# Patient Record
Sex: FEMALE | Race: WHITE | NOT HISPANIC OR LATINO | Employment: PART TIME | URBAN - METROPOLITAN AREA
[De-identification: names, ages, dates, MRNs, and addresses within clinical notes are randomized per-mention and may not be internally consistent; named-entity substitution may affect disease eponyms.]

---

## 2020-08-14 ENCOUNTER — HOSPITAL ENCOUNTER (OUTPATIENT)
Dept: LAB | Facility: MEDICAL CENTER | Age: 20
End: 2020-08-14
Attending: OBSTETRICS & GYNECOLOGY
Payer: COMMERCIAL

## 2020-08-14 LAB
25(OH)D3 SERPL-MCNC: 53 NG/ML (ref 30–100)
ALBUMIN SERPL BCP-MCNC: 4.8 G/DL (ref 3.2–4.9)
ALBUMIN/GLOB SERPL: 1.8 G/DL
ALP SERPL-CCNC: 79 U/L (ref 30–99)
ALT SERPL-CCNC: 9 U/L (ref 2–50)
ANION GAP SERPL CALC-SCNC: 13 MMOL/L (ref 7–16)
AST SERPL-CCNC: 17 U/L (ref 12–45)
BASOPHILS # BLD AUTO: 1 % (ref 0–1.8)
BASOPHILS # BLD: 0.06 K/UL (ref 0–0.12)
BILIRUB SERPL-MCNC: 0.7 MG/DL (ref 0.1–1.5)
BUN SERPL-MCNC: 14 MG/DL (ref 8–22)
CALCIUM SERPL-MCNC: 9.4 MG/DL (ref 8.4–10.2)
CHLORIDE SERPL-SCNC: 101 MMOL/L (ref 96–112)
CO2 SERPL-SCNC: 23 MMOL/L (ref 20–33)
CREAT SERPL-MCNC: 0.73 MG/DL (ref 0.5–1.4)
CRP SERPL HS-MCNC: 0.6 MG/L (ref 0–7.5)
DHEA-S SERPL-MCNC: 282 UG/DL (ref 148–407)
EOSINOPHIL # BLD AUTO: 0.08 K/UL (ref 0–0.51)
EOSINOPHIL NFR BLD: 1.3 % (ref 0–6.9)
ERYTHROCYTE [DISTWIDTH] IN BLOOD BY AUTOMATED COUNT: 38.7 FL (ref 35.9–50)
EST. AVERAGE GLUCOSE BLD GHB EST-MCNC: 103 MG/DL
FASTING STATUS PATIENT QL REPORTED: NORMAL
FERRITIN SERPL-MCNC: 17.1 NG/ML (ref 10–291)
GLOBULIN SER CALC-MCNC: 2.7 G/DL (ref 1.9–3.5)
GLUCOSE SERPL-MCNC: 83 MG/DL (ref 65–99)
HBA1C MFR BLD: 5.2 % (ref 0–5.6)
HCT VFR BLD AUTO: 40.6 % (ref 37–47)
HGB BLD-MCNC: 13.8 G/DL (ref 12–16)
IMM GRANULOCYTES # BLD AUTO: 0.01 K/UL (ref 0–0.11)
IMM GRANULOCYTES NFR BLD AUTO: 0.2 % (ref 0–0.9)
LYMPHOCYTES # BLD AUTO: 1.76 K/UL (ref 1–4.8)
LYMPHOCYTES NFR BLD: 27.9 % (ref 22–41)
MCH RBC QN AUTO: 30 PG (ref 27–33)
MCHC RBC AUTO-ENTMCNC: 34 G/DL (ref 33.6–35)
MCV RBC AUTO: 88.3 FL (ref 81.4–97.8)
MONOCYTES # BLD AUTO: 0.54 K/UL (ref 0–0.85)
MONOCYTES NFR BLD AUTO: 8.6 % (ref 0–13.4)
NEUTROPHILS # BLD AUTO: 3.86 K/UL (ref 2–7.15)
NEUTROPHILS NFR BLD: 61 % (ref 44–72)
NRBC # BLD AUTO: 0 K/UL
NRBC BLD-RTO: 0 /100 WBC
PLATELET # BLD AUTO: 313 K/UL (ref 164–446)
PMV BLD AUTO: 10.2 FL (ref 9–12.9)
POTASSIUM SERPL-SCNC: 3.7 MMOL/L (ref 3.6–5.5)
PROT SERPL-MCNC: 7.5 G/DL (ref 6–8.2)
RBC # BLD AUTO: 4.6 M/UL (ref 4.2–5.4)
SODIUM SERPL-SCNC: 137 MMOL/L (ref 135–145)
T3FREE SERPL-MCNC: 2.86 PG/ML (ref 2–4.4)
T4 FREE SERPL-MCNC: 1.26 NG/DL (ref 0.93–1.7)
T4 SERPL-MCNC: 6.5 UG/DL (ref 4–12)
TSH SERPL DL<=0.005 MIU/L-ACNC: 1.97 UIU/ML (ref 0.38–5.33)
VIT B12 SERPL-MCNC: 330 PG/ML (ref 211–911)
WBC # BLD AUTO: 6.3 K/UL (ref 4.8–10.8)

## 2020-08-14 PROCEDURE — 36415 COLL VENOUS BLD VENIPUNCTURE: CPT

## 2020-08-14 PROCEDURE — 86141 C-REACTIVE PROTEIN HS: CPT

## 2020-08-14 PROCEDURE — 83036 HEMOGLOBIN GLYCOSYLATED A1C: CPT

## 2020-08-14 PROCEDURE — 85025 COMPLETE CBC W/AUTO DIFF WBC: CPT

## 2020-08-14 PROCEDURE — 82728 ASSAY OF FERRITIN: CPT

## 2020-08-14 PROCEDURE — 82627 DEHYDROEPIANDROSTERONE: CPT

## 2020-08-14 PROCEDURE — 82306 VITAMIN D 25 HYDROXY: CPT

## 2020-08-14 PROCEDURE — 80053 COMPREHEN METABOLIC PANEL: CPT

## 2020-08-14 PROCEDURE — 84481 FREE ASSAY (FT-3): CPT

## 2020-08-14 PROCEDURE — 84436 ASSAY OF TOTAL THYROXINE: CPT

## 2020-08-14 PROCEDURE — 84439 ASSAY OF FREE THYROXINE: CPT

## 2020-08-14 PROCEDURE — 84443 ASSAY THYROID STIM HORMONE: CPT

## 2020-08-14 PROCEDURE — 82607 VITAMIN B-12: CPT

## 2022-05-21 ENCOUNTER — APPOINTMENT (OUTPATIENT)
Dept: RADIOLOGY | Facility: MEDICAL CENTER | Age: 22
End: 2022-05-21
Attending: EMERGENCY MEDICINE
Payer: COMMERCIAL

## 2022-05-21 ENCOUNTER — HOSPITAL ENCOUNTER (OUTPATIENT)
Facility: MEDICAL CENTER | Age: 22
End: 2022-05-22
Attending: EMERGENCY MEDICINE | Admitting: SURGERY
Payer: COMMERCIAL

## 2022-05-21 ENCOUNTER — ANESTHESIA EVENT (OUTPATIENT)
Dept: SURGERY | Facility: MEDICAL CENTER | Age: 22
End: 2022-05-21
Payer: COMMERCIAL

## 2022-05-21 ENCOUNTER — ANESTHESIA (OUTPATIENT)
Dept: SURGERY | Facility: MEDICAL CENTER | Age: 22
End: 2022-05-21
Payer: COMMERCIAL

## 2022-05-21 DIAGNOSIS — K35.80 ACUTE APPENDICITIS, UNSPECIFIED ACUTE APPENDICITIS TYPE: ICD-10-CM

## 2022-05-21 LAB
ALBUMIN SERPL BCP-MCNC: 4.7 G/DL (ref 3.2–4.9)
ALBUMIN/GLOB SERPL: 2 G/DL
ALP SERPL-CCNC: 74 U/L (ref 30–99)
ALT SERPL-CCNC: 13 U/L (ref 2–50)
ANION GAP SERPL CALC-SCNC: 12 MMOL/L (ref 7–16)
APPEARANCE UR: ABNORMAL
AST SERPL-CCNC: 21 U/L (ref 12–45)
BACTERIA #/AREA URNS HPF: NEGATIVE /HPF
BASOPHILS # BLD AUTO: 0.4 % (ref 0–1.8)
BASOPHILS # BLD: 0.09 K/UL (ref 0–0.12)
BILIRUB SERPL-MCNC: 0.2 MG/DL (ref 0.1–1.5)
BILIRUB UR QL STRIP.AUTO: NEGATIVE
BUN SERPL-MCNC: 13 MG/DL (ref 8–22)
CALCIUM SERPL-MCNC: 8.9 MG/DL (ref 8.5–10.5)
CHLORIDE SERPL-SCNC: 103 MMOL/L (ref 96–112)
CO2 SERPL-SCNC: 24 MMOL/L (ref 20–33)
COLOR UR: YELLOW
CREAT SERPL-MCNC: 0.7 MG/DL (ref 0.5–1.4)
EOSINOPHIL # BLD AUTO: 0.13 K/UL (ref 0–0.51)
EOSINOPHIL NFR BLD: 0.6 % (ref 0–6.9)
EPI CELLS #/AREA URNS HPF: NORMAL /HPF
ERYTHROCYTE [DISTWIDTH] IN BLOOD BY AUTOMATED COUNT: 40.3 FL (ref 35.9–50)
GFR SERPLBLD CREATININE-BSD FMLA CKD-EPI: 124 ML/MIN/1.73 M 2
GLOBULIN SER CALC-MCNC: 2.3 G/DL (ref 1.9–3.5)
GLUCOSE SERPL-MCNC: 107 MG/DL (ref 65–99)
GLUCOSE UR STRIP.AUTO-MCNC: NEGATIVE MG/DL
HCG SERPL QL: NEGATIVE
HCT VFR BLD AUTO: 39.5 % (ref 37–47)
HGB BLD-MCNC: 13.5 G/DL (ref 12–16)
IMM GRANULOCYTES # BLD AUTO: 0.09 K/UL (ref 0–0.11)
IMM GRANULOCYTES NFR BLD AUTO: 0.4 % (ref 0–0.9)
KETONES UR STRIP.AUTO-MCNC: ABNORMAL MG/DL
LEUKOCYTE ESTERASE UR QL STRIP.AUTO: NEGATIVE
LIPASE SERPL-CCNC: 23 U/L (ref 11–82)
LYMPHOCYTES # BLD AUTO: 1.57 K/UL (ref 1–4.8)
LYMPHOCYTES NFR BLD: 6.8 % (ref 22–41)
MCH RBC QN AUTO: 30.8 PG (ref 27–33)
MCHC RBC AUTO-ENTMCNC: 34.2 G/DL (ref 33.6–35)
MCV RBC AUTO: 90.2 FL (ref 81.4–97.8)
MICRO URNS: ABNORMAL
MONOCYTES # BLD AUTO: 1.46 K/UL (ref 0–0.85)
MONOCYTES NFR BLD AUTO: 6.3 % (ref 0–13.4)
NEUTROPHILS # BLD AUTO: 19.77 K/UL (ref 2–7.15)
NEUTROPHILS NFR BLD: 85.5 % (ref 44–72)
NITRITE UR QL STRIP.AUTO: NEGATIVE
NRBC # BLD AUTO: 0 K/UL
NRBC BLD-RTO: 0 /100 WBC
PH UR STRIP.AUTO: 5.5 [PH] (ref 5–8)
PLATELET # BLD AUTO: 329 K/UL (ref 164–446)
PMV BLD AUTO: 10 FL (ref 9–12.9)
POTASSIUM SERPL-SCNC: 3.4 MMOL/L (ref 3.6–5.5)
PROT SERPL-MCNC: 7 G/DL (ref 6–8.2)
PROT UR QL STRIP: NEGATIVE MG/DL
RBC # BLD AUTO: 4.38 M/UL (ref 4.2–5.4)
RBC # URNS HPF: NORMAL /HPF
RBC UR QL AUTO: NEGATIVE
SODIUM SERPL-SCNC: 139 MMOL/L (ref 135–145)
SP GR UR STRIP.AUTO: 1.03
UROBILINOGEN UR STRIP.AUTO-MCNC: 0.2 MG/DL
WBC # BLD AUTO: 23.1 K/UL (ref 4.8–10.8)
WBC #/AREA URNS HPF: NORMAL /HPF

## 2022-05-21 PROCEDURE — G0378 HOSPITAL OBSERVATION PER HR: HCPCS

## 2022-05-21 PROCEDURE — 99218 PR INITIAL OBSERVATION CARE,LEVL I: CPT | Mod: 57 | Performed by: SURGERY

## 2022-05-21 PROCEDURE — 160002 HCHG RECOVERY MINUTES (STAT): Performed by: SURGERY

## 2022-05-21 PROCEDURE — 501570 HCHG TROCAR, SEPARATOR: Performed by: SURGERY

## 2022-05-21 PROCEDURE — A9270 NON-COVERED ITEM OR SERVICE: HCPCS | Performed by: ANESTHESIOLOGY

## 2022-05-21 PROCEDURE — 44970 LAPAROSCOPY APPENDECTOMY: CPT | Mod: AS | Performed by: NURSE PRACTITIONER

## 2022-05-21 PROCEDURE — 00840 ANES IPER PX LOWER ABD NOS: CPT | Performed by: ANESTHESIOLOGY

## 2022-05-21 PROCEDURE — 84703 CHORIONIC GONADOTROPIN ASSAY: CPT

## 2022-05-21 PROCEDURE — 501571 HCHG TROCAR, SEPARATOR 12X100: Performed by: SURGERY

## 2022-05-21 PROCEDURE — 700102 HCHG RX REV CODE 250 W/ 637 OVERRIDE(OP): Performed by: SURGERY

## 2022-05-21 PROCEDURE — 81001 URINALYSIS AUTO W/SCOPE: CPT

## 2022-05-21 PROCEDURE — 88304 TISSUE EXAM BY PATHOLOGIST: CPT

## 2022-05-21 PROCEDURE — 700101 HCHG RX REV CODE 250: Performed by: SURGERY

## 2022-05-21 PROCEDURE — 94760 N-INVAS EAR/PLS OXIMETRY 1: CPT

## 2022-05-21 PROCEDURE — 501838 HCHG SUTURE GENERAL: Performed by: SURGERY

## 2022-05-21 PROCEDURE — 700111 HCHG RX REV CODE 636 W/ 250 OVERRIDE (IP): Performed by: ANESTHESIOLOGY

## 2022-05-21 PROCEDURE — 501450 HCHG STAPLES, ENDO MULTIFIRE: Performed by: SURGERY

## 2022-05-21 PROCEDURE — 85025 COMPLETE CBC W/AUTO DIFF WBC: CPT

## 2022-05-21 PROCEDURE — 74176 CT ABD & PELVIS W/O CONTRAST: CPT

## 2022-05-21 PROCEDURE — 500002 HCHG ADHESIVE, DERMABOND: Performed by: SURGERY

## 2022-05-21 PROCEDURE — 700101 HCHG RX REV CODE 250: Performed by: EMERGENCY MEDICINE

## 2022-05-21 PROCEDURE — 160048 HCHG OR STATISTICAL LEVEL 1-5: Performed by: SURGERY

## 2022-05-21 PROCEDURE — 96375 TX/PRO/DX INJ NEW DRUG ADDON: CPT

## 2022-05-21 PROCEDURE — 700111 HCHG RX REV CODE 636 W/ 250 OVERRIDE (IP): Performed by: EMERGENCY MEDICINE

## 2022-05-21 PROCEDURE — 500854 HCHG NEEDLE, INSUFFLATION FOR STEP: Performed by: SURGERY

## 2022-05-21 PROCEDURE — 700111 HCHG RX REV CODE 636 W/ 250 OVERRIDE (IP): Performed by: SURGERY

## 2022-05-21 PROCEDURE — 44970 LAPAROSCOPY APPENDECTOMY: CPT | Performed by: SURGERY

## 2022-05-21 PROCEDURE — 96376 TX/PRO/DX INJ SAME DRUG ADON: CPT

## 2022-05-21 PROCEDURE — 700105 HCHG RX REV CODE 258: Performed by: EMERGENCY MEDICINE

## 2022-05-21 PROCEDURE — 80053 COMPREHEN METABOLIC PANEL: CPT

## 2022-05-21 PROCEDURE — 160036 HCHG PACU - EA ADDL 30 MINS PHASE I: Performed by: SURGERY

## 2022-05-21 PROCEDURE — 36415 COLL VENOUS BLD VENIPUNCTURE: CPT

## 2022-05-21 PROCEDURE — 83690 ASSAY OF LIPASE: CPT

## 2022-05-21 PROCEDURE — 501581 HCHG TROCAR: Performed by: SURGERY

## 2022-05-21 PROCEDURE — 160009 HCHG ANES TIME/MIN: Performed by: SURGERY

## 2022-05-21 PROCEDURE — 700101 HCHG RX REV CODE 250: Performed by: ANESTHESIOLOGY

## 2022-05-21 PROCEDURE — 160035 HCHG PACU - 1ST 60 MINS PHASE I: Performed by: SURGERY

## 2022-05-21 PROCEDURE — 160029 HCHG SURGERY MINUTES - 1ST 30 MINS LEVEL 4: Performed by: SURGERY

## 2022-05-21 PROCEDURE — 99291 CRITICAL CARE FIRST HOUR: CPT

## 2022-05-21 PROCEDURE — A9270 NON-COVERED ITEM OR SERVICE: HCPCS | Performed by: SURGERY

## 2022-05-21 PROCEDURE — 700102 HCHG RX REV CODE 250 W/ 637 OVERRIDE(OP): Performed by: ANESTHESIOLOGY

## 2022-05-21 PROCEDURE — 160041 HCHG SURGERY MINUTES - EA ADDL 1 MIN LEVEL 4: Performed by: SURGERY

## 2022-05-21 PROCEDURE — 700105 HCHG RX REV CODE 258: Performed by: ANESTHESIOLOGY

## 2022-05-21 PROCEDURE — 96365 THER/PROPH/DIAG IV INF INIT: CPT

## 2022-05-21 PROCEDURE — 99140 ANES COMP EMERGENCY COND: CPT | Performed by: ANESTHESIOLOGY

## 2022-05-21 RX ORDER — ROCURONIUM BROMIDE 10 MG/ML
INJECTION, SOLUTION INTRAVENOUS PRN
Status: DISCONTINUED | OUTPATIENT
Start: 2022-05-21 | End: 2022-05-21 | Stop reason: SURG

## 2022-05-21 RX ORDER — CETIRIZINE HYDROCHLORIDE 10 MG/1
10 TABLET ORAL DAILY
COMMUNITY

## 2022-05-21 RX ORDER — HYDROMORPHONE HYDROCHLORIDE 1 MG/ML
0.4 INJECTION, SOLUTION INTRAMUSCULAR; INTRAVENOUS; SUBCUTANEOUS
Status: DISCONTINUED | OUTPATIENT
Start: 2022-05-21 | End: 2022-05-21 | Stop reason: HOSPADM

## 2022-05-21 RX ORDER — OXYCODONE HCL 5 MG/5 ML
10 SOLUTION, ORAL ORAL
Status: DISCONTINUED | OUTPATIENT
Start: 2022-05-21 | End: 2022-05-21 | Stop reason: HOSPADM

## 2022-05-21 RX ORDER — SODIUM CHLORIDE 9 MG/ML
INJECTION, SOLUTION INTRAVENOUS CONTINUOUS
Status: DISCONTINUED | OUTPATIENT
Start: 2022-05-21 | End: 2022-05-22

## 2022-05-21 RX ORDER — OXYCODONE HYDROCHLORIDE 10 MG/1
10 TABLET ORAL EVERY 4 HOURS PRN
Status: DISCONTINUED | OUTPATIENT
Start: 2022-05-21 | End: 2022-05-22 | Stop reason: HOSPADM

## 2022-05-21 RX ORDER — ONDANSETRON 2 MG/ML
INJECTION INTRAMUSCULAR; INTRAVENOUS PRN
Status: DISCONTINUED | OUTPATIENT
Start: 2022-05-21 | End: 2022-05-21 | Stop reason: SURG

## 2022-05-21 RX ORDER — BUPIVACAINE HYDROCHLORIDE 2.5 MG/ML
INJECTION, SOLUTION EPIDURAL; INFILTRATION; INTRACAUDAL
Status: DISCONTINUED | OUTPATIENT
Start: 2022-05-21 | End: 2022-05-21 | Stop reason: HOSPADM

## 2022-05-21 RX ORDER — KETOROLAC TROMETHAMINE 30 MG/ML
INJECTION, SOLUTION INTRAMUSCULAR; INTRAVENOUS PRN
Status: DISCONTINUED | OUTPATIENT
Start: 2022-05-21 | End: 2022-05-21 | Stop reason: SURG

## 2022-05-21 RX ORDER — OXYCODONE HCL 5 MG/5 ML
5 SOLUTION, ORAL ORAL
Status: COMPLETED | OUTPATIENT
Start: 2022-05-21 | End: 2022-05-21

## 2022-05-21 RX ORDER — CEFTRIAXONE 2 G/1
2 INJECTION, POWDER, FOR SOLUTION INTRAMUSCULAR; INTRAVENOUS ONCE
Status: COMPLETED | OUTPATIENT
Start: 2022-05-21 | End: 2022-05-21

## 2022-05-21 RX ORDER — DEXAMETHASONE SODIUM PHOSPHATE 4 MG/ML
INJECTION, SOLUTION INTRA-ARTICULAR; INTRALESIONAL; INTRAMUSCULAR; INTRAVENOUS; SOFT TISSUE PRN
Status: DISCONTINUED | OUTPATIENT
Start: 2022-05-21 | End: 2022-05-21 | Stop reason: SURG

## 2022-05-21 RX ORDER — SODIUM CHLORIDE 9 MG/ML
1000 INJECTION, SOLUTION INTRAVENOUS ONCE
Status: COMPLETED | OUTPATIENT
Start: 2022-05-21 | End: 2022-05-21

## 2022-05-21 RX ORDER — MEPERIDINE HYDROCHLORIDE 25 MG/ML
12.5 INJECTION INTRAMUSCULAR; INTRAVENOUS; SUBCUTANEOUS
Status: DISCONTINUED | OUTPATIENT
Start: 2022-05-21 | End: 2022-05-21 | Stop reason: HOSPADM

## 2022-05-21 RX ORDER — ACETAMINOPHEN 500 MG
500 TABLET ORAL EVERY 6 HOURS PRN
Status: DISCONTINUED | OUTPATIENT
Start: 2022-05-21 | End: 2022-05-22 | Stop reason: HOSPADM

## 2022-05-21 RX ORDER — OXYCODONE HYDROCHLORIDE 5 MG/1
5 TABLET ORAL EVERY 4 HOURS PRN
Status: DISCONTINUED | OUTPATIENT
Start: 2022-05-21 | End: 2022-05-22 | Stop reason: HOSPADM

## 2022-05-21 RX ORDER — SODIUM CHLORIDE, SODIUM LACTATE, POTASSIUM CHLORIDE, CALCIUM CHLORIDE 600; 310; 30; 20 MG/100ML; MG/100ML; MG/100ML; MG/100ML
INJECTION, SOLUTION INTRAVENOUS CONTINUOUS
Status: DISCONTINUED | OUTPATIENT
Start: 2022-05-21 | End: 2022-05-21 | Stop reason: HOSPADM

## 2022-05-21 RX ORDER — OXYCODONE HCL 5 MG/5 ML
5 SOLUTION, ORAL ORAL
Status: DISCONTINUED | OUTPATIENT
Start: 2022-05-21 | End: 2022-05-21 | Stop reason: HOSPADM

## 2022-05-21 RX ORDER — METRONIDAZOLE 500 MG/100ML
500 INJECTION, SOLUTION INTRAVENOUS ONCE
Status: COMPLETED | OUTPATIENT
Start: 2022-05-21 | End: 2022-05-21

## 2022-05-21 RX ORDER — ONDANSETRON 2 MG/ML
4 INJECTION INTRAMUSCULAR; INTRAVENOUS EVERY 4 HOURS PRN
Status: DISCONTINUED | OUTPATIENT
Start: 2022-05-21 | End: 2022-05-22 | Stop reason: HOSPADM

## 2022-05-21 RX ORDER — ALBUTEROL SULFATE 2.5 MG/3ML
2.5 SOLUTION RESPIRATORY (INHALATION)
Status: DISCONTINUED | OUTPATIENT
Start: 2022-05-21 | End: 2022-05-21 | Stop reason: HOSPADM

## 2022-05-21 RX ORDER — HYDROMORPHONE HYDROCHLORIDE 1 MG/ML
0.2 INJECTION, SOLUTION INTRAMUSCULAR; INTRAVENOUS; SUBCUTANEOUS
Status: DISCONTINUED | OUTPATIENT
Start: 2022-05-21 | End: 2022-05-21 | Stop reason: HOSPADM

## 2022-05-21 RX ORDER — ONDANSETRON 4 MG/1
4 TABLET, ORALLY DISINTEGRATING ORAL EVERY 4 HOURS PRN
Status: DISCONTINUED | OUTPATIENT
Start: 2022-05-21 | End: 2022-05-21

## 2022-05-21 RX ORDER — LIDOCAINE HYDROCHLORIDE 20 MG/ML
INJECTION, SOLUTION INFILTRATION; PERINEURAL
Status: DISCONTINUED | OUTPATIENT
Start: 2022-05-21 | End: 2022-05-21 | Stop reason: HOSPADM

## 2022-05-21 RX ORDER — HALOPERIDOL 5 MG/ML
1 INJECTION INTRAMUSCULAR
Status: DISCONTINUED | OUTPATIENT
Start: 2022-05-21 | End: 2022-05-21 | Stop reason: HOSPADM

## 2022-05-21 RX ORDER — ONDANSETRON 2 MG/ML
4 INJECTION INTRAMUSCULAR; INTRAVENOUS ONCE
Status: COMPLETED | OUTPATIENT
Start: 2022-05-21 | End: 2022-05-21

## 2022-05-21 RX ORDER — ONDANSETRON 2 MG/ML
4 INJECTION INTRAMUSCULAR; INTRAVENOUS
Status: DISCONTINUED | OUTPATIENT
Start: 2022-05-21 | End: 2022-05-21 | Stop reason: HOSPADM

## 2022-05-21 RX ORDER — MIDAZOLAM HYDROCHLORIDE 1 MG/ML
1 INJECTION INTRAMUSCULAR; INTRAVENOUS
Status: DISCONTINUED | OUTPATIENT
Start: 2022-05-21 | End: 2022-05-21 | Stop reason: HOSPADM

## 2022-05-21 RX ORDER — KETOROLAC TROMETHAMINE 30 MG/ML
30 INJECTION, SOLUTION INTRAMUSCULAR; INTRAVENOUS ONCE
Status: COMPLETED | OUTPATIENT
Start: 2022-05-21 | End: 2022-05-21

## 2022-05-21 RX ORDER — SODIUM CHLORIDE, SODIUM LACTATE, POTASSIUM CHLORIDE, CALCIUM CHLORIDE 600; 310; 30; 20 MG/100ML; MG/100ML; MG/100ML; MG/100ML
INJECTION, SOLUTION INTRAVENOUS
Status: DISCONTINUED | OUTPATIENT
Start: 2022-05-21 | End: 2022-05-21 | Stop reason: SURG

## 2022-05-21 RX ORDER — ACETAMINOPHEN 500 MG
500-1000 TABLET ORAL EVERY 6 HOURS PRN
COMMUNITY

## 2022-05-21 RX ORDER — HYDROMORPHONE HYDROCHLORIDE 1 MG/ML
0.1 INJECTION, SOLUTION INTRAMUSCULAR; INTRAVENOUS; SUBCUTANEOUS
Status: DISCONTINUED | OUTPATIENT
Start: 2022-05-21 | End: 2022-05-21 | Stop reason: HOSPADM

## 2022-05-21 RX ORDER — HYDROMORPHONE HYDROCHLORIDE 2 MG/ML
INJECTION, SOLUTION INTRAMUSCULAR; INTRAVENOUS; SUBCUTANEOUS PRN
Status: DISCONTINUED | OUTPATIENT
Start: 2022-05-21 | End: 2022-05-21 | Stop reason: SURG

## 2022-05-21 RX ORDER — DIPHENHYDRAMINE HYDROCHLORIDE 50 MG/ML
12.5 INJECTION INTRAMUSCULAR; INTRAVENOUS
Status: COMPLETED | OUTPATIENT
Start: 2022-05-21 | End: 2022-05-21

## 2022-05-21 RX ORDER — OXYCODONE HCL 5 MG/5 ML
10 SOLUTION, ORAL ORAL
Status: COMPLETED | OUTPATIENT
Start: 2022-05-21 | End: 2022-05-21

## 2022-05-21 RX ADMIN — CEFTRIAXONE SODIUM 2 G: 2 INJECTION, POWDER, FOR SOLUTION INTRAMUSCULAR; INTRAVENOUS at 07:58

## 2022-05-21 RX ADMIN — OXYCODONE 5 MG: 5 TABLET ORAL at 19:52

## 2022-05-21 RX ADMIN — METRONIDAZOLE 500 MG: 500 INJECTION, SOLUTION INTRAVENOUS at 08:09

## 2022-05-21 RX ADMIN — OXYCODONE HYDROCHLORIDE 5 MG: 5 SOLUTION ORAL at 15:30

## 2022-05-21 RX ADMIN — SODIUM CHLORIDE, POTASSIUM CHLORIDE, SODIUM LACTATE AND CALCIUM CHLORIDE: 600; 310; 30; 20 INJECTION, SOLUTION INTRAVENOUS at 10:42

## 2022-05-21 RX ADMIN — DIPHENHYDRAMINE HYDROCHLORIDE 12.5 MG: 50 INJECTION INTRAMUSCULAR; INTRAVENOUS at 12:41

## 2022-05-21 RX ADMIN — DEXAMETHASONE SODIUM PHOSPHATE 8 MG: 4 INJECTION, SOLUTION INTRA-ARTICULAR; INTRALESIONAL; INTRAMUSCULAR; INTRAVENOUS; SOFT TISSUE at 10:56

## 2022-05-21 RX ADMIN — PROPOFOL 170 MG: 10 INJECTION, EMULSION INTRAVENOUS at 10:31

## 2022-05-21 RX ADMIN — SODIUM CHLORIDE, POTASSIUM CHLORIDE, SODIUM LACTATE AND CALCIUM CHLORIDE: 600; 310; 30; 20 INJECTION, SOLUTION INTRAVENOUS at 14:28

## 2022-05-21 RX ADMIN — FENTANYL CITRATE 50 MCG: 50 INJECTION, SOLUTION INTRAMUSCULAR; INTRAVENOUS at 04:54

## 2022-05-21 RX ADMIN — DIPHENHYDRAMINE HYDROCHLORIDE 12.5 MG: 50 INJECTION INTRAMUSCULAR; INTRAVENOUS at 13:09

## 2022-05-21 RX ADMIN — ROCURONIUM BROMIDE 50 MG: 10 INJECTION, SOLUTION INTRAVENOUS at 10:31

## 2022-05-21 RX ADMIN — ONDANSETRON 4 MG: 2 INJECTION INTRAMUSCULAR; INTRAVENOUS at 11:11

## 2022-05-21 RX ADMIN — HYDROMORPHONE HYDROCHLORIDE 1 MG: 2 INJECTION INTRAMUSCULAR; INTRAVENOUS; SUBCUTANEOUS at 10:31

## 2022-05-21 RX ADMIN — ONDANSETRON 4 MG: 2 INJECTION INTRAMUSCULAR; INTRAVENOUS at 04:52

## 2022-05-21 RX ADMIN — KETOROLAC TROMETHAMINE 30 MG: 30 INJECTION, SOLUTION INTRAMUSCULAR; INTRAVENOUS at 04:53

## 2022-05-21 RX ADMIN — HYDROMORPHONE HYDROCHLORIDE 1 MG: 2 INJECTION INTRAMUSCULAR; INTRAVENOUS; SUBCUTANEOUS at 11:12

## 2022-05-21 RX ADMIN — KETOROLAC TROMETHAMINE 30 MG: 30 INJECTION, SOLUTION INTRAMUSCULAR at 11:11

## 2022-05-21 RX ADMIN — SODIUM CHLORIDE 1000 ML: 9 INJECTION, SOLUTION INTRAVENOUS at 04:56

## 2022-05-21 RX ADMIN — SODIUM CHLORIDE: 9 INJECTION, SOLUTION INTRAVENOUS at 07:59

## 2022-05-21 ASSESSMENT — LIFESTYLE VARIABLES
HAVE YOU EVER FELT YOU SHOULD CUT DOWN ON YOUR DRINKING: NO
EVER FELT BAD OR GUILTY ABOUT YOUR DRINKING: NO
HOW MANY TIMES IN THE PAST YEAR HAVE YOU HAD 5 OR MORE DRINKS IN A DAY: 0
ALCOHOL_USE: YES
TOTAL SCORE: 0
DOES PATIENT WANT TO STOP DRINKING: NO
TOTAL SCORE: 0
EVER HAD A DRINK FIRST THING IN THE MORNING TO STEADY YOUR NERVES TO GET RID OF A HANGOVER: NO
AVERAGE NUMBER OF DAYS PER WEEK YOU HAVE A DRINK CONTAINING ALCOHOL: 0
HAVE PEOPLE ANNOYED YOU BY CRITICIZING YOUR DRINKING: NO
ON A TYPICAL DAY WHEN YOU DRINK ALCOHOL HOW MANY DRINKS DO YOU HAVE: 1
TOTAL SCORE: 0
CONSUMPTION TOTAL: NEGATIVE

## 2022-05-21 ASSESSMENT — COGNITIVE AND FUNCTIONAL STATUS - GENERAL
SUGGESTED CMS G CODE MODIFIER MOBILITY: CH
DAILY ACTIVITIY SCORE: 24
MOBILITY SCORE: 24
SUGGESTED CMS G CODE MODIFIER DAILY ACTIVITY: CH

## 2022-05-21 ASSESSMENT — PAIN SCALES - GENERAL: PAIN_LEVEL: 2

## 2022-05-21 ASSESSMENT — PATIENT HEALTH QUESTIONNAIRE - PHQ9
1. LITTLE INTEREST OR PLEASURE IN DOING THINGS: NOT AT ALL
2. FEELING DOWN, DEPRESSED, IRRITABLE, OR HOPELESS: NOT AT ALL
SUM OF ALL RESPONSES TO PHQ9 QUESTIONS 1 AND 2: 0

## 2022-05-21 ASSESSMENT — PAIN DESCRIPTION - PAIN TYPE
TYPE: ACUTE PAIN

## 2022-05-21 ASSESSMENT — FIBROSIS 4 INDEX: FIB4 SCORE: 0.42

## 2022-05-21 NOTE — ED PROVIDER NOTES
"CHIEF COMPLAINT  Chief Complaint   Patient presents with   • Abdominal Pain   • N/V       HPI  Khushboo Khan is a 21 y.o. female who presents to the emergency department through triage with girlfriend for abdominal pain, nausea and vomiting.  Patient states she started having right lower quadrant abdominal pain around 10 PM.  History of celiac disease, states pain is unrelated to prior.  Associated with nausea and multiple episodes of vomiting.  No diarrhea.  Normal bowel movement yesterday.  No history for constipation.  Denies fever or chills.    Same-sex relationship.  Denies pregnancy.  No concern for STD.    REVIEW OF SYSTEMS  See HPI for further details. All other systems are negative.     PAST MEDICAL HISTORY   has a past medical history of Celiac disease.    SOCIAL HISTORY  Social History     Tobacco Use   • Smoking status: Never Smoker   • Smokeless tobacco: Never Used   Substance and Sexual Activity   • Alcohol use: Yes   • Drug use: Yes     Comment: THC   • Sexual activity: Not on file       SURGICAL HISTORY  patient denies any surgical history    CURRENT MEDICATIONS  Home Medications     Reviewed by Marifer Henderson RDEANA (Registered Nurse) on 05/21/22 at 1121  Med List Status: Complete   Medication Last Dose Status   acetaminophen (TYLENOL) 500 MG Tab 5/20/2022 Active   Cats Claw, Uncaria tomentosa, (CATS CLAW PO) 5/20/2022 Active   cetirizine (ZYRTEC) 10 MG Tab 5/20/2022 Active   Prenatal Vit-Fe Fumarate-FA (PRENATAL VITAMIN PO) 5/20/2022 Active                ALLERGIES  Allergies   Allergen Reactions   • Vicodin Hp [Apap-Fd&C Blue #1-Hydrocodone] Hives       PHYSICAL EXAM  VITAL SIGNS: /64   Pulse (!) 102   Temp 36.8 °C (98.3 °F)   Resp 17   Ht 1.727 m (5' 8\")   Wt 58.5 kg (128 lb 15.5 oz)   SpO2 98%   BMI 19.61 kg/m²   Pulse ox interpretation: I interpret this pulse ox as normal.  Constitutional: Alert in no apparent distress.  HENT: Normocephalic, atraumatic. Bilateral external " ears normal, Nose normal.   Eyes: Pupils are equal and reactive, Conjunctiva normal.   Neck: Normal range of motion, Supple  Lymphatic: No lymphadenopathy noted.   Cardiovascular: Regular rate and rhythm, no murmurs. Distal pulses intact.    Thorax & Lungs: Normal breath sounds.  No wheezing/rales/ronchi. No increased work of breathing  Abdomen: Soft, non-distended.  Tender to palpation in the right lower quadrant, positive McBurney point tenderness.  Focal guarding, no peritonitis.  No inguinal mass or hernia.  No CVA tenderness percussion.  Skin: Warm, Dry, No erythema, No rash.   Musculoskeletal: Good range of motion in all major joints.   Neurologic: Alert and orient x4.  Speech clear and cohesive.  Moves 4 extremity spontaneously.  Psychiatric: Affect normal, Judgment normal, Mood normal.       DIAGNOSTIC STUDIES / PROCEDURES  LABS  Results for orders placed or performed during the hospital encounter of 05/21/22   CBC WITH DIFFERENTIAL   Result Value Ref Range    WBC 23.1 (H) 4.8 - 10.8 K/uL    RBC 4.38 4.20 - 5.40 M/uL    Hemoglobin 13.5 12.0 - 16.0 g/dL    Hematocrit 39.5 37.0 - 47.0 %    MCV 90.2 81.4 - 97.8 fL    MCH 30.8 27.0 - 33.0 pg    MCHC 34.2 33.6 - 35.0 g/dL    RDW 40.3 35.9 - 50.0 fL    Platelet Count 329 164 - 446 K/uL    MPV 10.0 9.0 - 12.9 fL    Neutrophils-Polys 85.50 (H) 44.00 - 72.00 %    Lymphocytes 6.80 (L) 22.00 - 41.00 %    Monocytes 6.30 0.00 - 13.40 %    Eosinophils 0.60 0.00 - 6.90 %    Basophils 0.40 0.00 - 1.80 %    Immature Granulocytes 0.40 0.00 - 0.90 %    Nucleated RBC 0.00 /100 WBC    Neutrophils (Absolute) 19.77 (H) 2.00 - 7.15 K/uL    Lymphs (Absolute) 1.57 1.00 - 4.80 K/uL    Monos (Absolute) 1.46 (H) 0.00 - 0.85 K/uL    Eos (Absolute) 0.13 0.00 - 0.51 K/uL    Baso (Absolute) 0.09 0.00 - 0.12 K/uL    Immature Granulocytes (abs) 0.09 0.00 - 0.11 K/uL    NRBC (Absolute) 0.00 K/uL   COMP METABOLIC PANEL   Result Value Ref Range    Sodium 139 135 - 145 mmol/L    Potassium 3.4 (L)  3.6 - 5.5 mmol/L    Chloride 103 96 - 112 mmol/L    Co2 24 20 - 33 mmol/L    Anion Gap 12.0 7.0 - 16.0    Glucose 107 (H) 65 - 99 mg/dL    Bun 13 8 - 22 mg/dL    Creatinine 0.70 0.50 - 1.40 mg/dL    Calcium 8.9 8.5 - 10.5 mg/dL    AST(SGOT) 21 12 - 45 U/L    ALT(SGPT) 13 2 - 50 U/L    Alkaline Phosphatase 74 30 - 99 U/L    Total Bilirubin 0.2 0.1 - 1.5 mg/dL    Albumin 4.7 3.2 - 4.9 g/dL    Total Protein 7.0 6.0 - 8.2 g/dL    Globulin 2.3 1.9 - 3.5 g/dL    A-G Ratio 2.0 g/dL   LIPASE   Result Value Ref Range    Lipase 23 11 - 82 U/L   HCG QUAL SERUM   Result Value Ref Range    Beta-Hcg Qualitative Serum Negative Negative   URINALYSIS,CULTURE IF INDICATED    Specimen: Urine   Result Value Ref Range    Color Yellow     Character Cloudy (A)     Specific Gravity 1.027 <1.035    Ph 5.5 5.0 - 8.0    Glucose Negative Negative mg/dL    Ketones Trace (A) Negative mg/dL    Protein Negative Negative mg/dL    Bilirubin Negative Negative    Urobilinogen, Urine 0.2 Negative    Nitrite Negative Negative    Leukocyte Esterase Negative Negative    Occult Blood Negative Negative    Micro Urine Req Microscopic    URINE MICROSCOPIC (W/UA)   Result Value Ref Range    WBC 0-2 /hpf    RBC 0-2 /hpf    Bacteria Negative None /hpf    Epithelial Cells Few /hpf   ESTIMATED GFR   Result Value Ref Range    GFR (CKD-EPI) 124 >60 mL/min/1.73 m 2       RADIOLOGY  CT-RENAL COLIC EVALUATION(A/P W/O)   Final Result         1.  Slight hazy fat stranding adjacent to the appendix, could represent changes of early appendicitis.          COURSE & MEDICAL DECISION MAKING  Nursing notes and vital signs were reviewed. (See chart for details)  The patients records were reviewed, history was obtained from the patient;     Evaluation demonstrates acute appendicitis.  No evidence for perforation or abscess.  Leukocytosis with WBC 23.1, leftward shift without bandemia.  No electrolyte derangement.  Hemodynamically stable, mild tachycardia without hypotension or  fever.  No clinical evidence for sepsis.  Focal discomfort without peritonitis.  Pain controlled with Toradol, fentanyl.  Rocephin and Flagyl been added.  IV fluid bolus for clinical dehydration and vomiting.  She remains n.p.o. in the emergency department.    Dr. Trejo is aware of the patient agreeable to consultation.  We will plan the OR today.      FINAL IMPRESSION  (K35.80) Acute appendicitis, unspecified acute appendicitis type      Electronically signed by: Alana Boothe D.O., 5/21/2022 1:03 PM      This dictation was created using voice recognition software. The accuracy of the dictation is limited to the abilities of the software. I expect there may be some errors of grammar and possibly content. The nursing notes were reviewed and certain aspects of this information were incorporated into this note.

## 2022-05-21 NOTE — H&P
"    CHIEF COMPLAINT: Right lower quadrant pain.     HISTORY OF PRESENT ILLNESS: The patient is a 21 year-old White young woman who presents to the Emergency Department with a 12- hour history of severe and generalized abdominal pain. The pain is associated with nausea, vomiting and diaphoresis. The patient denies any recent or intercurrent illness. The patient denies any history of previous abdominal surgery. Last oral intake at 2300 last night.    PAST MEDICAL HISTORY:  has a past medical history of Celiac disease.    PAST SURGICAL HISTORY:  has no past surgical history on file.    ALLERGIES:   Allergies   Allergen Reactions   • Vicodin Hp [Apap-Fd&C Blue #1-Hydrocodone] Hives       CURRENT MEDICATIONS:    Home Medications     Reviewed by Ney Cuenca (Pharmacy Tech) on 05/21/22 at 0754  Med List Status: Complete   Medication Last Dose Status   acetaminophen (TYLENOL) 500 MG Tab 5/20/2022 Active   Cats Claw, Uncaria tomentosa, (CATS CLAW PO) 5/20/2022 Active   cetirizine (ZYRTEC) 10 MG Tab 5/20/2022 Active   Prenatal Vit-Fe Fumarate-FA (PRENATAL VITAMIN PO) 5/20/2022 Active                FAMILY HISTORY: family history is not on file.    SOCIAL HISTORY:  reports that she has never smoked. She has never used smokeless tobacco. She reports current alcohol use. She reports current drug use.    REVIEW OF SYSTEMS: Review of systems is remarkable for the following abdominal pain, nausea, vomiting, diaphroesis during episodes of vomiting. The remainder of the comprehensive ROS is negative with the exception of the aforementioned HPI, PMH, and PSH bullets in accordance with CMS guideline.    PHYSICAL EXAMINATION:      Constitutional:     Vital Signs: /58   Pulse 85   Temp 37.2 °C (98.9 °F) (Temporal)   Resp 16   Ht 1.727 m (5' 8\")   Wt 58.5 kg (128 lb 15.5 oz)   SpO2 95%    General Appearance: alert in no acute distress.   HEENT:    Demonstrates symmetric, reactive pupils. Extraocular muscles   are " intact. Nares and oropharynx are clear.   Neck:    Supple. No adenopathy.  Respiratory:   Inspection: Unlabored respirations, no intercostal retractions, paradoxical motion, or accessory muscle use.   Auscultation: normal.  Cardiovascular:   Inspection: The skin is warm and dry.  Auscultation: Regular rate and rhythm.   Peripheral Pulses: Normal.   Abdomen:  Inspection: Abdominal inspection reveals no abrasions, contusions, lacerations or penetrating wounds.   Palpation: Palpation is remarkable for mild tenderness in the right lower quadrant region. No abdominal wall hernias.  Extremities:   Examination of the upper and lower extremities demonstrates no cyanosis edema or clubbing.  Neurologic:   Alert & oriented to person, time and place. Normal motor function. Normal sensory function. No focal deficits noted.    LABORATORY VALUES:   Recent Labs     05/21/22  0343   WBC 23.1*   RBC 4.38   HEMOGLOBIN 13.5   HEMATOCRIT 39.5   MCV 90.2   MCH 30.8   MCHC 34.2   RDW 40.3   PLATELETCT 329   MPV 10.0     Recent Labs     05/21/22  0343   SODIUM 139   POTASSIUM 3.4*   CHLORIDE 103   CO2 24   GLUCOSE 107*   BUN 13   CREATININE 0.70   CALCIUM 8.9     Recent Labs     05/21/22  0343   ASTSGOT 21   ALTSGPT 13   TBILIRUBIN 0.2   ALKPHOSPHAT 74   GLOBULIN 2.3            IMAGING:   CT-RENAL COLIC EVALUATION(A/P W/O)   Final Result         1.  Slight hazy fat stranding adjacent to the appendix, could represent changes of early appendicitis.          ASSESSMENT AND PLAN:     Appendicitis, acute- (present on admission)  Assessment & Plan  History, labs, and imagining consistent with acute appendicitis  - IV antibiotics started in the ER  - To OR for laparoscopic appendectomy      The patient will be taken to the operating room for laparoscopic appendectomy.  The surgical conduct was discussed in detail. Potential complications including, but not limited to, infection, bleeding, damage to adjacent structures, need to convert to an open  procedure, and anesthetic complications were discussed. Questions were elicited and answered to the patient's satisfaction.  Operative consent signed.      ____________________________________     Von Trejo M.D.    DD: 5/21/2022  9:04 AM    Ko Mcintosh  [facility] NSQIP Surgical Risk Calculator

## 2022-05-21 NOTE — ED TRIAGE NOTES
"Chief Complaint   Patient presents with   • Abdominal Pain   • N/V     Pt complains of abdominal pain that radiated to low back and right flank. Onset of 2200 last night. Associated n/v. Pain is described as sharp, rated at a 4/10 on pain scale. States pain fluctuates in severity. Denies similar events. Has celiacs disease but states this does not feel the same.    /69   Pulse (!) 103   Temp 37.2 °C (98.9 °F) (Temporal)   Resp 14   Ht 1.727 m (5' 8\")   Wt 58.5 kg (128 lb 15.5 oz)   SpO2 98%   BMI 19.61 kg/m²      "

## 2022-05-21 NOTE — ED NOTES
Pt medicated per MAR. Pt resting on gurney, no distress noted, on all monitoring, call light within reach.

## 2022-05-21 NOTE — ANESTHESIA POSTPROCEDURE EVALUATION
Patient: Khushboo Khan    Procedure Summary     Date: 05/21/22 Room / Location: [place] [address] / SURGERY [facility]    Anesthesia Start: 1026 Anesthesia Stop: 1137    Procedure: APPENDECTOMY, LAPAROSCOPIC (Abdomen) Diagnosis: (Appendicitis, acute)    Surgeons: Von Trejo M.D. Responsible Provider: Eder Corbett M.D.    Anesthesia Type: general ASA Status: 2 - Emergent          Final Anesthesia Type: general  Last vitals  BP   Blood Pressure: 130/72    Temp   36.6 °C (97.9 °F)    Pulse   81   Resp   17    SpO2   99 %      Anesthesia Post Evaluation    Patient location during evaluation: PACU  Patient participation: complete - patient participated  Level of consciousness: awake and alert  Pain score: 2    Airway patency: patent  Anesthetic complications: no  Cardiovascular status: hemodynamically stable  Respiratory status: acceptable  Hydration status: euvolemic    PONV: none          No complications documented.     Nurse Pain Score: 0 (NPRS)

## 2022-05-21 NOTE — OR NURSING
1132 Pt arrived to podium 15b with even unlabored respirations, VSS on 4l oxygen via mask.  Pt arousable 3 stab sites CASS, CDI, WNL.    1145 RN called pt's mother and updated her on pt status and poc, she verbalized understanding.  Mom notes surgeon not yet out to update.  1200 RN contacted Dr. Trejo updated him that mom is in lobby, he will go and update her on post surgery and poc.  Pt's sats dropping to 89% on RA with sleep, RN placed 2lnc oxygen on pt. Pt remains with even and unlabored respirations.    1225 RN out to get family to have short visit with pt and update them on poc.    1250 Pt reports slight nausea and overall itching.  Pt medicated per MAR and changed into hospital gown from paper gown, will continue to monitor pt's needs.   1320 Pt reports no further nausea or itching.  Pt resting comfortably reports 2/10 pain and tolerable. Ice pack applied to abdomen on top of gown and 1 layer blanket.   1340 Pt resting comfortably with eyes closed, VSS on 1Lnc oxygen.  1405 RN called and updated pt's mother on waiting for room.  Will have family back once able.   1430 Family brought back to sit with pt.   1435 Pt up and ambulated independently and steadily from podium to bathroom and back.  Pt reports strong steady stream of urine flow without difficulty.  Pt sitting up in bed following ambulation drinking sprite and visiting with family.  1520 Pt reporting 5/10 low ab pain  1530 Pt medicated per MAR. Pt and family updated on 30min wait following pain medication.   1600 Dr. Trejo at bedside to evaluate pt.   1615 Report called to Arian RIVAS.  1645 Pt taken by transport to room, family remained with pt.

## 2022-05-21 NOTE — ASSESSMENT & PLAN NOTE
History, labs, and imagining consistent with acute appendicitis  - IV antibiotics started in the ER  - To OR for laparoscopic appendectomy

## 2022-05-21 NOTE — ANESTHESIA TIME REPORT
Anesthesia Start and Stop Event Times     Date Time Event    5/21/2022 1026 Anesthesia Start     1027 Ready for Procedure     1137 Anesthesia Stop        Responsible Staff  05/21/22    Name Role Begin End    Eder Corbett M.D. Anesth 1026 1137        Overtime Reason:  no overtime (within assigned shift)    Comments:

## 2022-05-21 NOTE — ED NOTES
Pt ambulated to bathroom with a steady gait. Assisted back into bed, placed on monitor, call light within reach.

## 2022-05-21 NOTE — ED NOTES
Pt ambulated to green 24 with steady gait, changed into gown, placed on monitor, and provided with warm blankets. Urine specimen sent to lab. Call light remains within reach. Chart up for ERP to see.

## 2022-05-21 NOTE — OP REPORT
DATE OF OPERATION:   5/21/2022     PREOPERATIVE DIAGNOSIS: Acute appendicitis.    POSTOPERATIVE DIAGNOSIS: Acute appendicitis.     PROCEDURE PERFORMED: Laparoscopic appendectomy     SURGEON:    Von Trejo M.D.    ASSISTANT:    ANDREW Enriquez. and Ovi Wallace M.D.    ANESTHESIOLOGIST:  Eder Corbett M.D.     ANESTHESIA:   General endotracheal anesthesia.    ASA CLASSIFICATION:  II. Emergent.    INDICATIONS: The patient is a 21 year-old young woman with clinical and radiographic findings of acute appendicitis. She is taken to the operating room for laparoscopic appendectomy.     The nature of the surgical procedure warranted additional skilled operative assistance from a licensed Physician Assistant (MARYANA). The assistant was present during the entire operation. The surgical assistant performed the following: provided assistance with optimal surgical exposure of the operative field, operated the camera for the laparoscopic portion of the procedure and performed the skin closure and dressing application.    FINDINGS: The appendix was mildly inflamed and non-perforated, thin red-brown fluid in the pelvis.    WOUND CLASSIFICATION:  Class II, Clean Contaminated.    SPECIMEN:     Appendix.    ESTIMATED BLOOD LOSS:  10 mL.     PROCEDURE: Following informed consent consent, the patient was properly identified, taken to the operating room and placed in supine position where general endotracheal anesthesia was administered. Intravenous antibiotics were administered in the preoperative setting within the correct time interval. A Paez catheter was aseptically placed. Sequential compression devices were employed. The abdomen was prepped and draped into a sterile field. A timeout was conducted with the full attention and participation of all operating room personnel.    Marcaine 0.5% with epinephrine was used to infiltrate the port sites. A 5 mm infraumbilical midline incision was made and subcutaneous tissue  spread bluntly. The fascia was elevated with a hook and a Veress needle was atraumatically inserted. Carbon dioxide pneumoperitoneum was instilled.     A 5 mm separator port was passed followed by a 5 mm 30 degree lens and camera. A 12 mm port was placed in left lower quadrant under direct vision. A 5 mm port was placed in suprapubic midline under direct vision. The appendix was identified and elevated. The filmy adhesions were taken down bluntly. The appendiceal mesentery was then taken down with a single firing of an Endo-TRACE stapler with White Vascular/Thin load.The appendix was divided at its base with a single firing of an Endo-TRACE stapler with a Blue Regular load.      The appendix was placed within a laparoscopic specimen retrieval bag and delivered intact from the abdominal cavity and submitted for permanent pathology. The resection site was inspected. Hemostasis was satisfactory.    The abdomen was desufflated and the ports were removed. The left lower quadrant port site fascia was approximated using a trocar site closure device with a 0 VICRYL® Plus Antibacterial suture. The port site skin incisions were closed with interrupted 4-0 MONOCRYL® subcuticular sutures. A DERMABOND ADVANCED® Topical Skin Adhesive dressing was applied.    The patient tolerated the procedure well, and there were no apparent complications. All sponge, needle, and instrument counts were correct on 2 separate occasions. The patient was awakened, extubated, and transferred to  to the post anesthesia care unit (PACU) in satisfactory condition.       ____________________________________     Von Trejo M.D.    DD: 5/21/2022  11:37 AM

## 2022-05-21 NOTE — ANESTHESIA PROCEDURE NOTES
Airway    Date/Time: 5/21/2022 10:31 AM  Performed by: Eder Corbett M.D.  Authorized by: Eder Corbett M.D.     Location:  OR  Urgency:  Elective  Difficult Airway: No    Indications for Airway Management:  Anesthesia      Spontaneous Ventilation: absent    Sedation Level:  Deep  Preoxygenated: Yes    Patient Position:  Sniffing  Final Airway Type:  Endotracheal airway  Final Endotracheal Airway:  ETT  Cuffed: Yes    Technique Used for Successful ETT Placement:  Direct laryngoscopy    Insertion Site:  Oral  Blade Type:  Zoila  Laryngoscope Blade/Videolaryngoscope Blade Size:  3  ETT Size (mm):  7.5  Measured from:  Lips  ETT to Lips (cm):  21  Placement Verified by: auscultation and capnometry    Cormack-Lehane Classification:  Grade IIa - partial view of glottis  Number of Attempts at Approach:  1  Number of Other Approaches Attempted:  0

## 2022-05-21 NOTE — ANESTHESIA PREPROCEDURE EVALUATION
Case: [id number] Date/Time: 05/21/22 0955    Procedure: APPENDECTOMY, LAPAROSCOPIC (Abdomen)    Anesthesia type: General    Pre-op diagnosis: Appendicitis, acute    Location: [facility] OR 10 / SURGERY [facility]    Surgeons: Von Trejo M.D.          Relevant Problems   No relevant active problems       Physical Exam    Airway   Mallampati: II  TM distance: >3 FB  Neck ROM: full       Cardiovascular - normal exam  Rhythm: regular  Rate: normal  (-) murmur     Dental - normal exam           Pulmonary - normal exam  Breath sounds clear to auscultation     Abdominal    Neurological - normal exam                 Anesthesia Plan    ASA 2- EMERGENT   ASA physical status emergent criteria: acute peritonitis    Plan - general       Airway plan will be ETT          Induction: intravenous    Postoperative Plan: Postoperative administration of opioids is intended.    Pertinent diagnostic labs and testing reviewed    Informed Consent:    Anesthetic plan and risks discussed with patient.    Use of blood products discussed with: patient whom consented to blood products.

## 2022-05-22 VITALS
OXYGEN SATURATION: 98 % | TEMPERATURE: 98.2 F | WEIGHT: 128.97 LBS | SYSTOLIC BLOOD PRESSURE: 101 MMHG | DIASTOLIC BLOOD PRESSURE: 57 MMHG | HEART RATE: 74 BPM | RESPIRATION RATE: 16 BRPM | HEIGHT: 68 IN | BODY MASS INDEX: 19.55 KG/M2

## 2022-05-22 LAB
BASOPHILS # BLD AUTO: 0.2 % (ref 0–1.8)
BASOPHILS # BLD: 0.02 K/UL (ref 0–0.12)
EOSINOPHIL # BLD AUTO: 0.02 K/UL (ref 0–0.51)
EOSINOPHIL NFR BLD: 0.2 % (ref 0–6.9)
ERYTHROCYTE [DISTWIDTH] IN BLOOD BY AUTOMATED COUNT: 40.9 FL (ref 35.9–50)
ERYTHROCYTE [DISTWIDTH] IN BLOOD BY AUTOMATED COUNT: 41.1 FL (ref 35.9–50)
HCT VFR BLD AUTO: 33.6 % (ref 37–47)
HCT VFR BLD AUTO: 34 % (ref 37–47)
HGB BLD-MCNC: 11.6 G/DL (ref 12–16)
HGB BLD-MCNC: 11.6 G/DL (ref 12–16)
IMM GRANULOCYTES # BLD AUTO: 0.04 K/UL (ref 0–0.11)
IMM GRANULOCYTES NFR BLD AUTO: 0.4 % (ref 0–0.9)
LYMPHOCYTES # BLD AUTO: 1.82 K/UL (ref 1–4.8)
LYMPHOCYTES NFR BLD: 19.9 % (ref 22–41)
MCH RBC QN AUTO: 30.9 PG (ref 27–33)
MCH RBC QN AUTO: 31.4 PG (ref 27–33)
MCHC RBC AUTO-ENTMCNC: 34.1 G/DL (ref 33.6–35)
MCHC RBC AUTO-ENTMCNC: 34.5 G/DL (ref 33.6–35)
MCV RBC AUTO: 90.7 FL (ref 81.4–97.8)
MCV RBC AUTO: 91.1 FL (ref 81.4–97.8)
MONOCYTES # BLD AUTO: 0.55 K/UL (ref 0–0.85)
MONOCYTES NFR BLD AUTO: 6 % (ref 0–13.4)
NEUTROPHILS # BLD AUTO: 6.7 K/UL (ref 2–7.15)
NEUTROPHILS NFR BLD: 73.3 % (ref 44–72)
NRBC # BLD AUTO: 0 K/UL
NRBC BLD-RTO: 0 /100 WBC
PLATELET # BLD AUTO: 235 K/UL (ref 164–446)
PLATELET # BLD AUTO: 237 K/UL (ref 164–446)
PMV BLD AUTO: 10.1 FL (ref 9–12.9)
PMV BLD AUTO: 10.3 FL (ref 9–12.9)
RBC # BLD AUTO: 3.69 M/UL (ref 4.2–5.4)
RBC # BLD AUTO: 3.75 M/UL (ref 4.2–5.4)
WBC # BLD AUTO: 9.1 K/UL (ref 4.8–10.8)
WBC # BLD AUTO: 9.2 K/UL (ref 4.8–10.8)

## 2022-05-22 PROCEDURE — 700102 HCHG RX REV CODE 250 W/ 637 OVERRIDE(OP): Performed by: SURGERY

## 2022-05-22 PROCEDURE — 85027 COMPLETE CBC AUTOMATED: CPT

## 2022-05-22 PROCEDURE — 99217 PR OBSERVATION CARE DISCHARGE: CPT | Performed by: NURSE PRACTITIONER

## 2022-05-22 PROCEDURE — 85025 COMPLETE CBC W/AUTO DIFF WBC: CPT

## 2022-05-22 PROCEDURE — A9270 NON-COVERED ITEM OR SERVICE: HCPCS | Performed by: SURGERY

## 2022-05-22 PROCEDURE — 36415 COLL VENOUS BLD VENIPUNCTURE: CPT

## 2022-05-22 PROCEDURE — G0378 HOSPITAL OBSERVATION PER HR: HCPCS

## 2022-05-22 RX ORDER — OXYCODONE HYDROCHLORIDE 5 MG/1
5 TABLET ORAL EVERY 6 HOURS PRN
Qty: 8 TABLET | Refills: 0 | Status: SHIPPED | OUTPATIENT
Start: 2022-05-22 | End: 2022-05-24

## 2022-05-22 RX ADMIN — OXYCODONE 5 MG: 5 TABLET ORAL at 05:21

## 2022-05-22 RX ADMIN — OXYCODONE 5 MG: 5 TABLET ORAL at 09:24

## 2022-05-22 ASSESSMENT — PAIN DESCRIPTION - PAIN TYPE
TYPE: ACUTE PAIN;SURGICAL PAIN
TYPE: ACUTE PAIN;SURGICAL PAIN
TYPE: ACUTE PAIN

## 2022-05-22 NOTE — DISCHARGE SUMMARY
Discharge Summary    DATE OF ADMISSION: 5/21/2022    DATE OF DISCHARGE:  5/22/2022    DISCHARGE DIAGNOSIS:  ? Acute appendicitis     CONSULTATIONS:  ? none    PROCEDURES:  ? Laparoscopic appendectomy     BRIEF HPI and HOSPITAL COURSE:  ? Admitted with above, see admission history and physical. Underwent procedure as above. On day of discharge, the patient has stable vital signs, on room air, tolerating an oral diet, and pain is well controlled with PO meds. She was afebrile, nontoxic in appearance and had a WBC of 9.1. She had no acute abdominal pain and her surgical incisions were approximated with no overt signs of bleeding. The patient is stable for discharge to home.    DISPOSITION: Discharged on 5/22/2022. The patient was  counseled and questions were answered. Specifically, signs and symptoms of infection, return of pre-hospital symptoms and persistent or worsening abdominal pain were discussed and the patient agrees to seek medical attention if any of these develop.    DISCHARGE MEDICATIONS:  The patients controlled substance history was reviewed and a controlled substance use informed consent (if applicable) was provided by [facility] and the patient has been prescribed.     Medication List      CONTINUE taking these medications      Instructions   acetaminophen 500 MG Tabs  Commonly known as: TYLENOL   Take 500-1,000 mg by mouth every 6 hours as needed. Indications: Pain  Dose: 500-1,000 mg     CATS CLAW PO   Take 1 Tablet by mouth every day.  Dose: 1 Tablet     cetirizine 10 MG Tabs  Commonly known as: ZYRTEC   Take 10 mg by mouth every day.  Dose: 10 mg     PRENATAL VITAMIN PO   Take 1 Tablet by mouth every day.  Dose: 1 Tablet            ACTIVITY:  No strenuous exercise or heavy lifting (more than 10 lbs) until released in follow up.      WOUND CARE:  You may shower, but do not submerge in a bath for at least two weeks. If you have wound dressings, they may come off after 48  hours. If you have skin glue to the wound, this will fall off on its own, do not pick at it. If you have steri strips to the wound, these will fall off on their own, do not pick at them, may trim the edges if needed.    DIET:  Orders Placed This Encounter   Procedures   • Diet Order Diet: Regular     Standing Status:   Standing     Number of Occurrences:   1     Order Specific Question:   Diet:     Answer:   Regular [1]       FOLLOW UP:  Von Trejo M.D.  [address] [id number]  [facility] [id number] [phone]    Schedule an appointment as soon as possible for a visit in 1 week(s)      TIME SPENT ON DISCHARGE: 45 minutes    ____________________________________________  OTTO Rivera    DD: 5/22/2022 7:56 AM

## 2022-05-22 NOTE — PROGRESS NOTES
Patient is being discharged as per physician order. Discharged home with family.    Patient demonstrated understanding of discharge instructions, follow-up appointments, new or continuing prescriptions, home care for surgical wound, and nursing care instructions for appendicitis.     Patient is ambulating without assistance, voiding without difficulty, pain is well controlled, tolerating oral medications, oxygen saturation ? than 90% , tolerating diet.     Educational handouts have been provided and discussed with the patient and family, patient has verbalized understanding. Educated patient on when/why they should return to ED or seek medical attention. All patient’s questions/concerns have been answered. All patient belongings have been packed and removed by the patient.

## 2022-05-22 NOTE — DISCHARGE INSTRUCTIONS
Discharge Instructions    Discharged to home by car with relative. Discharged via wheelchair, hospital escort: Yes.  Special equipment needed: N/A    Be sure to schedule a follow-up appointment with your primary care doctor or any specialists as instructed.     Discharge Plan:   Diet Plan: Discussed  Activity Level: Discussed  Confirmed Follow up Appointment: Patient to Call and Schedule Appointment  Confirmed Symptoms Management: Discussed  Medication Reconciliation Updated: No (Comments)    I understand that a diet low in cholesterol, fat, and sodium is recommended for good health. Unless I have been given specific instructions below for another diet, I accept this instruction as my diet prescription.   Other diet: Regular as per normal    Special Instructions: None    Is patient discharged on Warfarin / Coumadin?   No     Call or seek medical attention for questions or concerns - Follow up with Dr. Von Trejo, surgery, in 1 weeks time - Follow up with primary care provider within one weeks time - Resume regular diet - May take over the counter acetaminophen or ibuprofen as needed for pain - Continue daily over the counter stool softener while on narcotics - No operation of machinery or motorized vehicles while under the influence of narcotics - No alcohol, marijuana or illicit drug use while under the influence of narcotics - In the event of a narcotic overdose naloxone (Narcan) is available without a prescription from any [facility] or [facility] -     No swimming, hot tubs, baths or wound submersion until cleared by outpatient provider.     May shower - No contact sports, strenuous activities, or heavy lifting until cleared by outpatient provider - If persistent or worsening abdominal pain or signs or symptoms of infection occur seek medical attention    Depression / Suicide Risk    As you are discharged from this [facility] facility, it is important to learn how to keep safe from harming  yourself.    Recognize the warning signs:  Abrupt changes in personality, positive or negative- including increase in energy   Giving away possessions  Change in eating patterns- significant weight changes-  positive or negative  Change in sleeping patterns- unable to sleep or sleeping all the time   Unwillingness or inability to communicate  Depression  Unusual sadness, discouragement and loneliness  Talk of wanting to die  Neglect of personal appearance   Rebelliousness- reckless behavior  Withdrawal from people/activities they love  Confusion- inability to concentrate     If you or a loved one observes any of these behaviors or has concerns about self-harm, here's what you can do:  Talk about it- your feelings and reasons for harming yourself  Remove any means that you might use to hurt yourself (examples: pills, rope, extension cords, firearm)  Get professional help from the community (Mental Health, Substance Abuse, psychological counseling)  Do not be alone:Call your Safe Contact- someone whom you trust who will be there for you.  Call your local CRISIS HOTLINE 963-3694 or 011-401-0186  Call your local Children's Mobile Crisis Response Team [place] (269) 806-5019 or www.[place]  Call the toll free National Suicide Prevention Hotlines   National Suicide Prevention Lifeline 670-198-CUXT (1345)  National Hope Line Network 800-SUICIDE (337-4484)

## 2022-05-22 NOTE — CARE PLAN
The patient is Stable - Low risk of patient condition declining or worsening    Shift Goals  Clinical Goals: Pain control, discharge  Patient Goals: Pain control, discharge  Family Goals: observation/discharge    Progress made toward(s) clinical / shift goals:    Problem: Pain - Standard  Goal: Alleviation of pain or a reduction in pain to the patient’s comfort goal  Outcome: Progressing  Flowsheets (Taken 5/22/2022 1000)  OB Pain Intervention: Medication - See MAR     Problem: Knowledge Deficit - Standard  Goal: Patient and family/care givers will demonstrate understanding of plan of care, disease process/condition, diagnostic tests and medications  Outcome: Progressing

## 2022-05-22 NOTE — PROGRESS NOTES
Handover report received from ROB Caicedo in PACU, with thanks. Per handover, transport of patient has been arranged and patient is/will be enroute.    Khushboo arrived and admitted to room T404 via transport gurney from PACU at 1709 hrs.   Arrival/admitting vitals as per flowsheets.    Patient reports pain of 4 on a scale of 0-10 denies PRN medication at this time. Patient education given regarding pharmacologic and non-pharmacologic modalities for pain management. Patient oriented to room, call light, visitor, and non-smoking policy. Reviewed plan of care (equipment, incentive spirometer, sequential compression devices, medications, activity, diet, fall precautions, skin care, and pain) with patient and family. Welcome packet given and reviewed with patient, all questions answered. Patient education provided on oral hygiene while in hospital.     Alert & Oriented x4. Patient denies chest pain/shortness of breath.   See 2 RN Skin Check Documentation   Patient tolerating clear liquid diet, and denies nausea and/or vomiting.   + void. + BM. Last BM 5/20   Patient ambulating independently with steady gait.     All patient needs have been met at this time, call light within reach. Patient calls appropriately. Bed in low and locked position, non-skid socks in situ.   Hourly rounding in place.

## 2022-05-22 NOTE — PROGRESS NOTES
Report received at bedside from previous shift RN  Assumed care. Pt in bed A/O x4. VSS.  Responds appropriately.   Pain 5/10, medicated per MAR, provided non pharmacological interventions for comfort  Denies SOB, chest pain  Denies N/V tolerating clear liquid diet   Adequate oxygenation noted at room air  3 lap sites to abdomen open to air/dermabond c/d/i  Patient upself, steady gate, educated on fall risk prevention.   +void, +flattus, last BM 5/20  Assessment complete.   Discussed POC, pt verbalizes understanding.   Encouraged the use of incentive spirometer  Explained importance of calling before getting OOB.   Call light and belongings within reach. Bed alarm off, low fall risk per farrah kovacs.  Bed in the lowest position. Treaded socks in place.   Hourly rounding in progress.   Will continue to monitor, all needs met

## 2022-05-22 NOTE — PROGRESS NOTES
4 Eyes Skin Assessment Completed by ROB Don and ORB Cleary.    Head WDL  Ears WDL  Nose WDL  Mouth WDL  Neck WDL  Breast/Chest WDL  Shoulder Blades WDL  Spine WDL  (R) Arm/Elbow/Hand WDL  (L) Arm/Elbow/Hand WDL  Abdomen 3x lap stab, well approximated  Groin WDL  Scrotum/Coccyx/Buttocks WDL  (R) Leg WDL  (L) Leg WDL  (R) Heel/Foot/Toe WDL  (L) Heel/Foot/Toe Scar    Devices In Places Blood Pressure Cuff, Pulse Ox, SCD's and Nasal Cannula    Interventions In Place N/A    Possible Skin Injury No    Pictures Uploaded Into Epic N/A  Wound Consult Placed N/A  RN Wound Prevention Protocol Ordered No

## 2022-05-22 NOTE — PROGRESS NOTES
Bedside report received from previous shift RN and assumed full care of patient.  Assessments complete as per flowsheets.    Alert & Oriented x4.   Patient denies chest pain/shortness of breath.  Patient reports 2-3/10 pain.     Medication given per MAR.     Patient tolerating diet, currently denies nausea and vomiting.    Patient ambulating independently with steady gait.    All patient needs have been met at this time, call light within reach.  Reinforced falls program rationale with patient.   Verified bed is in low and locked position, non-skid socks in place.    Hourly rounding in place.

## 2022-05-22 NOTE — CARE PLAN
The patient is Stable - Low risk of patient condition declining or worsening    Shift Goals  Clinical Goals: pain management  Patient Goals: pain magement  Family Goals: observation/discharge    Progress made toward(s) clinical / shift goals:    Problem: Pain - Standard  Goal: Alleviation of pain or a reduction in pain to the patient’s comfort goal  Outcome: Progressing  Note: Patient provided pharmacological and non pharmacological interventions.     Problem: Knowledge Deficit - Standard  Goal: Patient and family/care givers will demonstrate understanding of plan of care, disease process/condition, diagnostic tests and medications  Outcome: Progressing  Note: Patient verbalizes understanding of POC       Patient is not progressing towards the following goals:

## 2022-05-22 NOTE — PROGRESS NOTES
Patient consulted by ANDREW Francis for home Rx of a controlled substance.    New Controlled Substances Use Informed Consent reviewed with patient and signed by patient.  All questions answered.    Patient being discharged with nil chest pain/discomfort.  Patient A&O 4, ambulatory with nil concerns from nursing at this time.

## 2022-05-22 NOTE — PROGRESS NOTES
Discharge paperwork completed and signed by patient.  All questions answered.    Patient expressed concern about going home with no pain medication except for OTC acetaminophen, requesting a couple days of additional pain meds prior to DC.    This RN VOALTEd ANDREW Francis to advise.   - no new orders at this time

## 2022-05-23 LAB — PATHOLOGY CONSULT NOTE: NORMAL

## 2022-05-27 ENCOUNTER — APPOINTMENT (OUTPATIENT)
Dept: RADIOLOGY | Facility: MEDICAL CENTER | Age: 22
End: 2022-05-27
Attending: EMERGENCY MEDICINE
Payer: COMMERCIAL

## 2022-05-27 ENCOUNTER — HOSPITAL ENCOUNTER (EMERGENCY)
Facility: MEDICAL CENTER | Age: 22
End: 2022-05-27
Attending: EMERGENCY MEDICINE
Payer: COMMERCIAL

## 2022-05-27 VITALS
SYSTOLIC BLOOD PRESSURE: 122 MMHG | OXYGEN SATURATION: 96 % | DIASTOLIC BLOOD PRESSURE: 69 MMHG | HEART RATE: 101 BPM | BODY MASS INDEX: 19.05 KG/M2 | WEIGHT: 125.66 LBS | HEIGHT: 68 IN | RESPIRATION RATE: 18 BRPM | TEMPERATURE: 97.7 F

## 2022-05-27 DIAGNOSIS — R10.84 GENERALIZED ABDOMINAL PAIN: ICD-10-CM

## 2022-05-27 DIAGNOSIS — N83.201 CYST OF RIGHT OVARY: ICD-10-CM

## 2022-05-27 DIAGNOSIS — K59.00 CONSTIPATION, UNSPECIFIED CONSTIPATION TYPE: ICD-10-CM

## 2022-05-27 LAB
ALBUMIN SERPL BCP-MCNC: 4.8 G/DL (ref 3.2–4.9)
ALBUMIN/GLOB SERPL: 1.5 G/DL
ALP SERPL-CCNC: 67 U/L (ref 30–99)
ALT SERPL-CCNC: 17 U/L (ref 2–50)
ANION GAP SERPL CALC-SCNC: 13 MMOL/L (ref 7–16)
APPEARANCE UR: CLEAR
AST SERPL-CCNC: 19 U/L (ref 12–45)
BASOPHILS # BLD AUTO: 0.6 % (ref 0–1.8)
BASOPHILS # BLD: 0.05 K/UL (ref 0–0.12)
BILIRUB SERPL-MCNC: 0.3 MG/DL (ref 0.1–1.5)
BILIRUB UR QL STRIP.AUTO: NEGATIVE
BUN SERPL-MCNC: 12 MG/DL (ref 8–22)
CALCIUM SERPL-MCNC: 9.8 MG/DL (ref 8.5–10.5)
CHLORIDE SERPL-SCNC: 102 MMOL/L (ref 96–112)
CO2 SERPL-SCNC: 22 MMOL/L (ref 20–33)
COLOR UR: YELLOW
CREAT SERPL-MCNC: 0.64 MG/DL (ref 0.5–1.4)
EOSINOPHIL # BLD AUTO: 0.11 K/UL (ref 0–0.51)
EOSINOPHIL NFR BLD: 1.4 % (ref 0–6.9)
ERYTHROCYTE [DISTWIDTH] IN BLOOD BY AUTOMATED COUNT: 39.8 FL (ref 35.9–50)
GFR SERPLBLD CREATININE-BSD FMLA CKD-EPI: 127 ML/MIN/1.73 M 2
GLOBULIN SER CALC-MCNC: 3.2 G/DL (ref 1.9–3.5)
GLUCOSE SERPL-MCNC: 104 MG/DL (ref 65–99)
GLUCOSE UR STRIP.AUTO-MCNC: NEGATIVE MG/DL
HCG SERPL QL: NEGATIVE
HCT VFR BLD AUTO: 45.4 % (ref 37–47)
HGB BLD-MCNC: 15.5 G/DL (ref 12–16)
IMM GRANULOCYTES # BLD AUTO: 0.03 K/UL (ref 0–0.11)
IMM GRANULOCYTES NFR BLD AUTO: 0.4 % (ref 0–0.9)
KETONES UR STRIP.AUTO-MCNC: NEGATIVE MG/DL
LEUKOCYTE ESTERASE UR QL STRIP.AUTO: NEGATIVE
LIPASE SERPL-CCNC: 26 U/L (ref 11–82)
LYMPHOCYTES # BLD AUTO: 1.87 K/UL (ref 1–4.8)
LYMPHOCYTES NFR BLD: 24.2 % (ref 22–41)
MCH RBC QN AUTO: 30.9 PG (ref 27–33)
MCHC RBC AUTO-ENTMCNC: 34.1 G/DL (ref 33.6–35)
MCV RBC AUTO: 90.6 FL (ref 81.4–97.8)
MICRO URNS: ABNORMAL
MONOCYTES # BLD AUTO: 0.47 K/UL (ref 0–0.85)
MONOCYTES NFR BLD AUTO: 6.1 % (ref 0–13.4)
NEUTROPHILS # BLD AUTO: 5.21 K/UL (ref 2–7.15)
NEUTROPHILS NFR BLD: 67.3 % (ref 44–72)
NITRITE UR QL STRIP.AUTO: NEGATIVE
NRBC # BLD AUTO: 0 K/UL
NRBC BLD-RTO: 0 /100 WBC
PH UR STRIP.AUTO: 7.5 [PH] (ref 5–8)
PLATELET # BLD AUTO: 385 K/UL (ref 164–446)
PMV BLD AUTO: 10.1 FL (ref 9–12.9)
POTASSIUM SERPL-SCNC: 4.6 MMOL/L (ref 3.6–5.5)
PROT SERPL-MCNC: 8 G/DL (ref 6–8.2)
PROT UR QL STRIP: NEGATIVE MG/DL
RBC # BLD AUTO: 5.01 M/UL (ref 4.2–5.4)
RBC UR QL AUTO: NEGATIVE
SODIUM SERPL-SCNC: 137 MMOL/L (ref 135–145)
SP GR UR STRIP.AUTO: >=1.045
UROBILINOGEN UR STRIP.AUTO-MCNC: 0.2 MG/DL
WBC # BLD AUTO: 7.7 K/UL (ref 4.8–10.8)

## 2022-05-27 PROCEDURE — 84703 CHORIONIC GONADOTROPIN ASSAY: CPT

## 2022-05-27 PROCEDURE — 700111 HCHG RX REV CODE 636 W/ 250 OVERRIDE (IP): Performed by: EMERGENCY MEDICINE

## 2022-05-27 PROCEDURE — 96374 THER/PROPH/DIAG INJ IV PUSH: CPT

## 2022-05-27 PROCEDURE — 700105 HCHG RX REV CODE 258: Performed by: EMERGENCY MEDICINE

## 2022-05-27 PROCEDURE — 74177 CT ABD & PELVIS W/CONTRAST: CPT

## 2022-05-27 PROCEDURE — 99285 EMERGENCY DEPT VISIT HI MDM: CPT

## 2022-05-27 PROCEDURE — 80053 COMPREHEN METABOLIC PANEL: CPT

## 2022-05-27 PROCEDURE — 96376 TX/PRO/DX INJ SAME DRUG ADON: CPT

## 2022-05-27 PROCEDURE — 76856 US EXAM PELVIC COMPLETE: CPT

## 2022-05-27 PROCEDURE — 700117 HCHG RX CONTRAST REV CODE 255: Performed by: EMERGENCY MEDICINE

## 2022-05-27 PROCEDURE — 81003 URINALYSIS AUTO W/O SCOPE: CPT

## 2022-05-27 PROCEDURE — 36415 COLL VENOUS BLD VENIPUNCTURE: CPT

## 2022-05-27 PROCEDURE — 85025 COMPLETE CBC W/AUTO DIFF WBC: CPT

## 2022-05-27 PROCEDURE — 83690 ASSAY OF LIPASE: CPT

## 2022-05-27 PROCEDURE — 99283 EMERGENCY DEPT VISIT LOW MDM: CPT | Mod: 24 | Performed by: SURGERY

## 2022-05-27 PROCEDURE — 96375 TX/PRO/DX INJ NEW DRUG ADDON: CPT

## 2022-05-27 RX ORDER — POLYETHYLENE GLYCOL 3350 17 G/17G
17 POWDER, FOR SOLUTION ORAL DAILY
Qty: 30 EACH | Refills: 0 | Status: SHIPPED | OUTPATIENT
Start: 2022-05-27

## 2022-05-27 RX ORDER — ONDANSETRON 2 MG/ML
4 INJECTION INTRAMUSCULAR; INTRAVENOUS ONCE
Status: COMPLETED | OUTPATIENT
Start: 2022-05-27 | End: 2022-05-27

## 2022-05-27 RX ORDER — SODIUM CHLORIDE 9 MG/ML
1000 INJECTION, SOLUTION INTRAVENOUS ONCE
Status: COMPLETED | OUTPATIENT
Start: 2022-05-27 | End: 2022-05-27

## 2022-05-27 RX ORDER — HYDROMORPHONE HYDROCHLORIDE 1 MG/ML
0.5 INJECTION, SOLUTION INTRAMUSCULAR; INTRAVENOUS; SUBCUTANEOUS ONCE
Status: COMPLETED | OUTPATIENT
Start: 2022-05-27 | End: 2022-05-27

## 2022-05-27 RX ORDER — OXYCODONE HYDROCHLORIDE AND ACETAMINOPHEN 5; 325 MG/1; MG/1
1 TABLET ORAL EVERY 4 HOURS PRN
Qty: 12 TABLET | Refills: 0 | Status: SHIPPED | OUTPATIENT
Start: 2022-05-27 | End: 2022-06-01

## 2022-05-27 RX ADMIN — ONDANSETRON HYDROCHLORIDE 4 MG: 2 SOLUTION INTRAMUSCULAR; INTRAVENOUS at 12:38

## 2022-05-27 RX ADMIN — HYDROMORPHONE HYDROCHLORIDE 0.5 MG: 1 INJECTION, SOLUTION INTRAMUSCULAR; INTRAVENOUS; SUBCUTANEOUS at 14:05

## 2022-05-27 RX ADMIN — MORPHINE SULFATE 6 MG: 10 INJECTION INTRAVENOUS at 12:38

## 2022-05-27 RX ADMIN — HYDROMORPHONE HYDROCHLORIDE 0.5 MG: 1 INJECTION, SOLUTION INTRAMUSCULAR; INTRAVENOUS; SUBCUTANEOUS at 16:32

## 2022-05-27 RX ADMIN — IOHEXOL 80 ML: 350 INJECTION, SOLUTION INTRAVENOUS at 14:00

## 2022-05-27 RX ADMIN — SODIUM CHLORIDE 1000 ML: 9 INJECTION, SOLUTION INTRAVENOUS at 12:38

## 2022-05-27 RX ADMIN — ONDANSETRON 4 MG: 2 INJECTION INTRAMUSCULAR; INTRAVENOUS at 18:45

## 2022-05-27 ASSESSMENT — PAIN DESCRIPTION - PAIN TYPE: TYPE: ACUTE PAIN

## 2022-05-27 ASSESSMENT — FIBROSIS 4 INDEX: FIB4 SCORE: 0.57

## 2022-05-27 NOTE — ED NOTES
Patient medicated with Dilaudid. Patient denies any needs at this time. Mother remains at bedside.

## 2022-05-27 NOTE — ED NOTES
Patient appears more relaxed. States that she is still in pain, but the medication is taking the edge off. Now rates pain at 8/10. Patient being transported to CT at this time.

## 2022-05-27 NOTE — ED PROVIDER NOTES
ED Provider Note    Scribed for Aline Obregon M.D. by Kamran Tang. 5/27/2022  12:24 PM    Means of arrival: Walk in  History obtained from: Patient  History limited by: None      CHIEF COMPLAINT  Chief Complaint   Patient presents with   • Abdominal Pain     Pt reports that she had her appendix removed here 6 days ago. She reports slight abdominal pain since but worse today. Pt also endorses nausea and vomiting but denies diarrhea or fevers.        LUDIN Khan is a 21 y.o. female who presents to the Emergency Department for evaluation of moderate abdominal pain onset 6 days ago, worsening today. The patient recently underwent an appendectomy by Dr. Trejo approximately 6 days ago and reports mild improvement in her abdominal pain since the procedure. Yesterday her abdominal pain suddenly began intensifying which prompted her visit to the ED today. She localizes the abdominal pain to her general abdomen but is most noticeable in the left lower quadrant. Associated symptoms include decreased PO intake, nausea, vomiting, and chills. The patient denies any fever or diarrhea. The patient reports only having 3 bowel movements since the procedure and was taking stool softeners but has since discontinued usage. She reports medicating with Tylenol with minimal alleviation. The patient has a history of Celiac disease and mass cell activation syndrome. She is allergic to Vicodin. No exacerbating factors noted.     REVIEW OF SYSTEMS  Pertinent positive include abdominal pain, decreased PO intake, nausea, vomiting, and chills. Pertinent negative include fever or diarrhea. All other systems reviewed and are negative.    PAST MEDICAL HISTORY   has a past medical history of Asthma, Celiac disease, and Mast cell activation syndrome (HCC).    SOCIAL HISTORY  Social History     Tobacco Use   • Smoking status: Never Smoker   • Smokeless tobacco: Never Used   Substance and Sexual Activity   • Alcohol use: Yes   • Drug use:  "Yes     Comment: THC   • Sexual activity: None noted       SURGICAL HISTORY   has a past surgical history that includes lap,appendectomy (5/21/2022).    CURRENT MEDICATIONS  Home Medications     Reviewed by Siobhan Alvarado R.N. (Registered Nurse) on 05/27/22 at 1125  Med List Status: Not Addressed   Medication Last Dose Status   acetaminophen (TYLENOL) 500 MG Tab  Active   Cats Claw, Uncaria tomentosa, (CATS CLAW PO)  Active   cetirizine (ZYRTEC) 10 MG Tab  Active   Prenatal Vit-Fe Fumarate-FA (PRENATAL VITAMIN PO)  Active                ALLERGIES  Allergies   Allergen Reactions   • Vicodin Hp [Apap-Fd&C Blue #1-Hydrocodone] Hives       PHYSICAL EXAM   VITAL SIGNS: /82   Pulse 87   Temp 36.5 °C (97.7 °F) (Temporal)   Resp 16   Ht 1.727 m (5' 8\")   Wt 57 kg (125 lb 10.6 oz)   LMP 05/06/2022 (Approximate)   SpO2 97%   BMI 19.11 kg/m²    Constitutional: Uncomfortable but nontoxic appearing.  woman accompanied by her mother  HENT: Normocephalic, Atraumatic. Bilateral external ears normal. Nose normal.  Moist mucous membranes.  Oropharynx clear.  Eyes: Pupils are equal and reactive. Conjunctiva normal.   Neck: Supple, full range of motion  Heart: Regular rate and rhythm.  No murmurs.    Lungs: No respiratory distress, normal work of breathing. Lungs clear to auscultation bilaterally.  Abdomen Soft, no distention. Diffuse abdominal tenderness with voluntary guarding. Incisions appear clean dry and intact, no surrounding erythema.  Musculoskeletal: Atraumatic. No obvious deformities noted.  No lower extremity edema.  Skin: Warm, Dry.  No erythema, No rash.   Neurologic: Alert and oriented x3. Moving all extremities spontaneously without focal deficits.  Psychiatric: Affect normal, Mood normal, Appears appropriate and not intoxicated.      DIAGNOSTIC STUDIES    LABS  Personally reviewed by me  Labs Reviewed   COMP METABOLIC PANEL - Abnormal; Notable for the following components:       Result Value "    Glucose 104 (*)     All other components within normal limits   URINALYSIS,CULTURE IF INDICATED - Abnormal; Notable for the following components:    Specific Gravity >=1.045 (*)     All other components within normal limits    Narrative:     Indication for culture:->Patient WITHOUT an indwelling Paez  catheter in place with new onset of Dysuria, Frequency,  Urgency, and/or Suprapubic pain   CBC WITH DIFFERENTIAL   LIPASE   HCG QUAL SERUM   ESTIMATED GFR       RADIOLOGY  Personally reviewed by me  US-PELVIC COMPLETE (TRANSABDOMINAL/TRANSVAGINAL) (COMBO)   Final Result      1.  Right ovarian cyst with low level, lacelike internal echoes, likely a hemorrhagic cyst. There is small volume free fluid in the pelvis. Ultrasound in 6-12 weeks could be performed to evaluate for resolution/decrease size of the cyst.      CT-ABDOMEN-PELVIS WITH   Final Result      1. Postsurgical changes in the cecum, compatible with interval appendectomy.   2. Mild fat stranding in the right lower quadrant and tiny amounts of free intraperitoneal air, likely the sequelae of prior laparoscopy.   3. Moderate ascites in the pelvis, with no pelvic abscess.   4. Right ovarian cyst measuring 2.6 cm in diameter.   5. The remainder of the examination is within normal limits.          ED COURSE  Vitals:    05/27/22 1630 05/27/22 1700 05/27/22 1740 05/27/22 1800   BP: 108/63 118/59 126/60 122/69   Pulse: 86 92 88 (!) 101   Resp: 18 18 18    Temp:    36.5 °C (97.7 °F)   TempSrc:    Temporal   SpO2: 95% 96% 97% 96%   Weight:       Height:             Medications administered:  Medications   morphine 10 mg/mL injection 6 mg (6 mg Intravenous Given 5/27/22 1238)   ondansetron (ZOFRAN) syringe/vial injection 4 mg (4 mg Intravenous Given 5/27/22 1238)   NS infusion 1,000 mL (0 mL Intravenous Stopped 5/27/22 1420)   iohexol (OMNIPAQUE) 350 mg/mL (IV) (80 mL Intravenous Given 5/27/22 1400)   HYDROmorphone (Dilaudid) injection 0.5 mg (0.5 mg Intravenous  Given 5/27/22 1405)   HYDROmorphone (Dilaudid) injection 0.5 mg (0.5 mg Intravenous Given 5/27/22 1632)   ondansetron (ZOFRAN) syringe/vial injection 4 mg (4 mg Intravenous Given 5/27/22 1845)     Patient was given IV fluids for Acute Vomiting.  IV hydration was used because oral hydration was not adequate alone.  Following fluid administration patient's symptom were improved.      Old records personally reviewed:  She was last seen on 05/21/22 for appendicitis.    12:24 PM Patient seen and examined at bedside. The patient presents with abdominal pain. Ordered for UA, HCG Qual Serum, Lipase, CMP. CBC with diff, CT-Abdomen-Pelvis With to evaluate. Patient will be treated with NS Infusion 1000 mL, Zofran 4 mg, and Morphine 6 mg for her symptoms. Discussed utilizing imaging and labs to further evaluate the patient, they are amenable to the plan of care.      MEDICAL DECISION MAKING  Patient who is 6 days following uncomplicated appendectomy presenting with increasing abdominal pain and vomiting.  She is afebrile with normal vital signs on arrival however very uncomfortable appearing.  Her incisions appear intact without signs of surrounding infection.  Labs are very reassuring with the normal white blood cell count, no anemia.  Patient is not pregnant.  Imaging shows postsurgical changes in the abdomen as well as small amounts of free air that are likely related to recent surgery.  She does have what appears to be a new right sided ovarian cyst with some fluid in the abdomen.    2:39 PM I discussed the patient's case and the above findings with Dr. Trejo (Surgery) who is currently unable to review the patient's imaging at this time. They relayed that they are slightly concerned about the fluid shown on the CT. Additionally, they recommended consulting with the ACS service to have them review the images and evaluate the patient.     3:06 PM - I discussed the patient's case and the above findings with Dr. Rodríguez (Surgery)  who relayed that upon reviewing the images, they do not see anything concerning for post operative complications. Additionally, that the patient does appear to be constipated. She discussed findings at bedside with patient.    5:58 PM - Upon reassessment, patient is resting comfortably with normal vital signs.  No new complaints at this time.  Pain seems to be controlled and patient is tolerating water without difficulty.  Pelvic ultrasound does show a likely a hemorrhagic cyst which is likely the cause of the fluid.  Will discharge home on pain medication, stool regimen, and follow-up with gynecology in 6 to 12 weeks for repeat ultrasound.  Discussed results with patient and/or family as well as importance of primary care follow up.  Patient understands plan of care and strict return precautions for new or changing symptoms.           DISPOSITION:  Patient will be discharged home in stable condition.    FOLLOW UP:  Scott Hamilton D.O.  [address] [address]  [phone]    Schedule an appointment as soon as possible for a visit       [facility]  [address] [id number] [phone]  Schedule an appointment as soon as possible for a visit       [facility], Emergency Dept  [address] [facility] [id number] [phone]    If symptoms worsen      OUTPATIENT MEDICATIONS:  Discharge Medication List as of 5/27/2022  6:17 PM      START taking these medications    Details   oxyCODONE-acetaminophen (PERCOCET) 5-325 MG Tab Take 1 Tablet by mouth every four hours as needed for Severe Pain for up to 5 days., Disp-12 Tablet, R-0, Normal      polyethylene glycol/lytes (MIRALAX) 17 g Pack Take 1 Packet by mouth every day., Disp-30 Each, R-0, Normal               IMPRESSION  (R10.84) Generalized abdominal pain  (N83.201) Cyst of right ovary  (K59.00) Constipation, unspecified constipation type    Results, diagnoses, and treatment options were  discussed with the patient and/or family. Patient verbalized understanding of plan of care.      In prescribing controlled substances to this patient, I certify that I have obtained and reviewed the medical history of Khushboo Khan. I have also made a good daniel effort to obtain applicable records from other providers who have treated the patient and records did not demonstrate any increased risk of substance abuse that would prevent me from prescribing controlled substances.     I have conducted a physical exam and documented it. I have reviewed Ms. Khan’s prescription history as maintained by the [place] Prescription Monitoring Program.     I have assessed the patient’s risk for abuse, dependency, and addiction using the validated Opioid Risk Tool available at https://www.mdcalc.com/clgsuo-ysve-iaas-ort-narcotic-abuse.     Given the above, I believe the benefits of controlled substance therapy outweigh the risks. The reasons for prescribing controlled substances include non-narcotic, oral analgesic alternatives have been inadequate for pain control. Accordingly, I have discussed the risk and benefits, treatment plan, and alternative therapies with the patient.          Kamran GALVIN (Ermelinda), am scribing for, and in the presence of, Aline Obregon M.D..    Electronically signed by: Kamran Tang (Ermelinda), 5/27/2022    Aline GALVIN M.D. personally performed the services described in this documentation, as scribed by Kamran Tang in my presence, and it is both accurate and complete.    The note accurately reflects work and decisions made by me.  Aline Obregon M.D.  5/28/2022  12:19 PM

## 2022-05-27 NOTE — CONSULTS
DATE OF CONSULTATION:  5/27/2022     REFERRING PHYSICIAN:   Aline Obregon M.D.     CONSULTING PHYSICIAN:  Elisha Rodríguez M.D.     REASON FOR CONSULTATION:  I have been asked by  to see the patient in surgical consultation for evaluation of abdominal pain following appendectomy on 5/21/22.    HISTORY OF PRESENT ILLNESS: The patient is a 21 year-old White woman who presents to the Emergency Department with a 2- day history of moderate, diffuse and transient generalized abdominal pain. The pain is associated with nausea and malaise.  Patient had her appendix removed 6 days ago.  At that time her recovery was uneventful.  Her incisions are clean dry and intact.  She says in the last 24 hours she started having a significant increase in her pain and that this morning she felt lightheaded and came to the emergency department.  She denies any fever.  She does have nausea associated with the pain and lightheadedness.  Her lab results in the emergency department are completely normal, with exception of glucose 104.  She had a CT scan today which shows an ovarian cyst on the right ovary that is approximately 2.6 cm.  She also appears quite constipated on her CT scan although she says she has had some bowel movement since her surgery.  Prior to today she feels as though her recovery has been incredibly slow.  She is tolerating a normal diet at home.    PAST MEDICAL HISTORY:  has a past medical history of Celiac disease.    PAST SURGICAL HISTORY:  has a past surgical history that includes pr lap,appendectomy (5/21/2022).    ALLERGIES:   Allergies   Allergen Reactions   • Vicodin Hp [Apap-Fd&C Blue #1-Hydrocodone] Hives       CURRENT MEDICATIONS:    Home Medications     Reviewed by Siobhan Alvarado R.N. (Registered Nurse) on 05/27/22 at 1125  Med List Status: Not Addressed   Medication Last Dose Status   acetaminophen (TYLENOL) 500 MG Tab  Active   Cats Claw, Uncaria tomentosa, (CATS CLAW PO)  Active    cetirizine (ZYRTEC) 10 MG Tab  Active   Prenatal Vit-Fe Fumarate-FA (PRENATAL VITAMIN PO)  Active                FAMILY HISTORY: family history is not on file.  Mother has a history of SMA syndrome    SOCIAL HISTORY:  reports that she has never smoked. She has never used smokeless tobacco. She reports current alcohol use. She reports current drug use.    REVIEW OF SYSTEMS: Comprehensive review of systems is negative with the exception of the aforementioned HPI, PMH, and PSH bullets in accordance with CMS guidelines.    PHYSICAL EXAMINATION:    Physical Exam  Vitals and nursing note reviewed. Exam conducted with a chaperone present.   Constitutional:       Appearance: Normal appearance. She is normal weight.   HENT:      Head: Normocephalic and atraumatic.      Right Ear: Tympanic membrane normal.      Left Ear: Tympanic membrane normal.      Nose: Nose normal.      Mouth/Throat:      Mouth: Mucous membranes are moist.      Pharynx: Oropharynx is clear.   Eyes:      Conjunctiva/sclera: Conjunctivae normal.      Pupils: Pupils are equal, round, and reactive to light.   Cardiovascular:      Rate and Rhythm: Normal rate and regular rhythm.      Pulses: Normal pulses.      Heart sounds: Normal heart sounds.   Pulmonary:      Effort: Pulmonary effort is normal.      Breath sounds: Normal breath sounds.   Abdominal:      General: Bowel sounds are normal.      Palpations: Abdomen is soft.      Tenderness: There is abdominal tenderness.      Comments: Mild global discomfort, no focal pain.  Mild swelling, no distention, Incisions are clean, dry and intact.    Musculoskeletal:         General: No swelling or tenderness. Normal range of motion.      Cervical back: Normal range of motion and neck supple.   Skin:     General: Skin is warm and dry.      Capillary Refill: Capillary refill takes less than 2 seconds.   Neurological:      General: No focal deficit present.      Mental Status: She is alert and oriented to person,  place, and time.   Psychiatric:         Mood and Affect: Mood normal.         Behavior: Behavior normal.         Thought Content: Thought content normal.         LABORATORY VALUES:   Recent Labs     05/27/22  1132   WBC 7.7   RBC 5.01   HEMOGLOBIN 15.5   HEMATOCRIT 45.4   MCV 90.6   MCH 30.9   MCHC 34.1   RDW 39.8   PLATELETCT 385   MPV 10.1     Recent Labs     05/27/22  1132   SODIUM 137   POTASSIUM 4.6   CHLORIDE 102   CO2 22   GLUCOSE 104*   BUN 12   CREATININE 0.64   CALCIUM 9.8     Recent Labs     05/27/22  1132   ASTSGOT 19   ALTSGPT 17   TBILIRUBIN 0.3   ALKPHOSPHAT 67   GLOBULIN 3.2            IMAGING:   CT-ABDOMEN-PELVIS WITH   Final Result      1. Postsurgical changes in the cecum, compatible with interval appendectomy.   2. Mild fat stranding in the right lower quadrant and tiny amounts of free intraperitoneal air, likely the sequelae of prior laparoscopy.   3. Moderate ascites in the pelvis, with no pelvic abscess.   4. Right ovarian cyst measuring 2.6 cm in diameter.   5. The remainder of the examination is within normal limits.          ASSESSMENT AND PLAN:     Generalized abdominal pain- (present on admission)  Assessment & Plan  Post operative day 6 from laparoscopic appendectomy with what appears to be new ovarian cyst on same side.  Pelvic ascites appears benign in nature.  Constipation is present.    Recommend DC home with aggressive bowel regimen inclusive of daily miralax.  Extensive precautions for return and counseling along with review of images at bedside.   Plan to follow up in [facility] clinic in next 2 weeks.           ____________________________________     Elisha Rodríguez M.D.    DD: 5/27/2022  4:03 PM

## 2022-05-27 NOTE — ED NOTES
In to check on patient. Patient states that pain is starting to return. Rates it at 7/10. Will notify ERP. Patient denies any further needs.

## 2022-05-27 NOTE — ED TRIAGE NOTES
"Chief Complaint   Patient presents with   • Abdominal Pain     Pt reports that she had her appendix removed here 6 days ago. She reports slight abdominal pain since but worse today. Pt also endorses nausea and vomiting but denies diarrhea or fevers.      /82   Pulse 87   Temp 36.5 °C (97.7 °F) (Temporal)   Resp 16   Ht 1.727 m (5' 8\")   Wt 57 kg (125 lb 10.6 oz)   LMP 05/06/2022 (Approximate)   SpO2 97%   BMI 19.11 kg/m²       "

## 2022-05-27 NOTE — ED NOTES
Patient wheeled to bathroom. Sample cup provided. Patient able to provide urine sample. Patient states that her pain is getting better. Now rates it at a 6/10. Urine sent to lab. Patient denies any further needs at this time.

## 2022-05-27 NOTE — ED NOTES
Patient appears in a lot of pain. Able to slowly move from wheelchair to bed. Instructed to gown. Patient's mom at bedside. Patient denied help with gown.

## 2022-05-27 NOTE — ASSESSMENT & PLAN NOTE
Post operative day 6 from laparoscopic appendectomy with what appears to be new ovarian cyst on same side.  Pelvic ascites appears benign in nature.  Constipation is present.    Recommend DC home with aggressive bowel regimen inclusive of daily miralax.  Extensive precautions for return and counseling along with review of images at bedside.   Plan to follow up in [facility] clinic in next 2 weeks.

## 2022-05-27 NOTE — ED NOTES
Reassessed patient after medication administration. Pain still around a 7/10. Patient appears a little more uncomfortable. States that she's doing okay. Denies any needs. Awaiting US.

## 2022-05-27 NOTE — ED NOTES
Patient medicated per MAR. Patient requesting water. Explained that she needed to stay NPO until after US results are back. Patient expressed understanding. Denies any further needs.

## 2022-05-28 ENCOUNTER — HOSPITAL ENCOUNTER (INPATIENT)
Facility: MEDICAL CENTER | Age: 22
LOS: 3 days | DRG: 392 | End: 2022-05-31
Attending: EMERGENCY MEDICINE | Admitting: INTERNAL MEDICINE
Payer: COMMERCIAL

## 2022-05-28 ENCOUNTER — APPOINTMENT (OUTPATIENT)
Dept: RADIOLOGY | Facility: MEDICAL CENTER | Age: 22
DRG: 392 | End: 2022-05-28
Attending: EMERGENCY MEDICINE
Payer: COMMERCIAL

## 2022-05-28 DIAGNOSIS — N83.201 CYST OF RIGHT OVARY: ICD-10-CM

## 2022-05-28 DIAGNOSIS — R10.84 INTRACTABLE GENERALIZED ABDOMINAL PAIN: ICD-10-CM

## 2022-05-28 DIAGNOSIS — Z90.49 S/P APPENDECTOMY: ICD-10-CM

## 2022-05-28 DIAGNOSIS — R65.10 SIRS (SYSTEMIC INFLAMMATORY RESPONSE SYNDROME) (HCC): ICD-10-CM

## 2022-05-28 PROBLEM — A41.9 SEPSIS (HCC): Status: ACTIVE | Noted: 2022-05-28

## 2022-05-28 PROBLEM — R10.9 ABDOMINAL PAIN: Status: ACTIVE | Noted: 2022-05-28

## 2022-05-28 LAB
ALBUMIN SERPL BCP-MCNC: 4.8 G/DL (ref 3.2–4.9)
ALBUMIN/GLOB SERPL: 1.7 G/DL
ALP SERPL-CCNC: 69 U/L (ref 30–99)
ALT SERPL-CCNC: 16 U/L (ref 2–50)
ANION GAP SERPL CALC-SCNC: 17 MMOL/L (ref 7–16)
APPEARANCE UR: CLEAR
AST SERPL-CCNC: 22 U/L (ref 12–45)
BACTERIA #/AREA URNS HPF: ABNORMAL /HPF
BASOPHILS # BLD AUTO: 0.3 % (ref 0–1.8)
BASOPHILS # BLD: 0.06 K/UL (ref 0–0.12)
BILIRUB SERPL-MCNC: 0.4 MG/DL (ref 0.1–1.5)
BILIRUB UR QL STRIP.AUTO: NEGATIVE
BUN SERPL-MCNC: 11 MG/DL (ref 8–22)
CALCIUM SERPL-MCNC: 9.7 MG/DL (ref 8.5–10.5)
CHLORIDE SERPL-SCNC: 102 MMOL/L (ref 96–112)
CO2 SERPL-SCNC: 16 MMOL/L (ref 20–33)
COLOR UR: YELLOW
CREAT SERPL-MCNC: 0.59 MG/DL (ref 0.5–1.4)
EOSINOPHIL # BLD AUTO: 0.01 K/UL (ref 0–0.51)
EOSINOPHIL NFR BLD: 0.1 % (ref 0–6.9)
EPI CELLS #/AREA URNS HPF: ABNORMAL /HPF
ERYTHROCYTE [DISTWIDTH] IN BLOOD BY AUTOMATED COUNT: 38.8 FL (ref 35.9–50)
GFR SERPLBLD CREATININE-BSD FMLA CKD-EPI: 129 ML/MIN/1.73 M 2
GLOBULIN SER CALC-MCNC: 2.9 G/DL (ref 1.9–3.5)
GLUCOSE SERPL-MCNC: 157 MG/DL (ref 65–99)
GLUCOSE UR STRIP.AUTO-MCNC: NEGATIVE MG/DL
HCT VFR BLD AUTO: 40.5 % (ref 37–47)
HGB BLD-MCNC: 14.4 G/DL (ref 12–16)
HYALINE CASTS #/AREA URNS LPF: ABNORMAL /LPF
IMM GRANULOCYTES # BLD AUTO: 0.08 K/UL (ref 0–0.11)
IMM GRANULOCYTES NFR BLD AUTO: 0.4 % (ref 0–0.9)
KETONES UR STRIP.AUTO-MCNC: 40 MG/DL
LACTATE BLD-SCNC: 1.4 MMOL/L (ref 0.5–2)
LACTATE BLD-SCNC: 1.4 MMOL/L (ref 0.5–2)
LACTATE BLD-SCNC: 1.8 MMOL/L (ref 0.5–2)
LACTATE BLD-SCNC: 4.6 MMOL/L (ref 0.5–2)
LEUKOCYTE ESTERASE UR QL STRIP.AUTO: ABNORMAL
LIPASE SERPL-CCNC: 27 U/L (ref 11–82)
LYMPHOCYTES # BLD AUTO: 1.96 K/UL (ref 1–4.8)
LYMPHOCYTES NFR BLD: 10.9 % (ref 22–41)
MCH RBC QN AUTO: 31.1 PG (ref 27–33)
MCHC RBC AUTO-ENTMCNC: 35.6 G/DL (ref 33.6–35)
MCV RBC AUTO: 87.5 FL (ref 81.4–97.8)
MICRO URNS: ABNORMAL
MONOCYTES # BLD AUTO: 0.81 K/UL (ref 0–0.85)
MONOCYTES NFR BLD AUTO: 4.5 % (ref 0–13.4)
NEUTROPHILS # BLD AUTO: 14.98 K/UL (ref 2–7.15)
NEUTROPHILS NFR BLD: 83.8 % (ref 44–72)
NITRITE UR QL STRIP.AUTO: NEGATIVE
NRBC # BLD AUTO: 0 K/UL
NRBC BLD-RTO: 0 /100 WBC
PH UR STRIP.AUTO: 7 [PH] (ref 5–8)
PLATELET # BLD AUTO: 413 K/UL (ref 164–446)
PMV BLD AUTO: 10.2 FL (ref 9–12.9)
POTASSIUM SERPL-SCNC: 4.1 MMOL/L (ref 3.6–5.5)
PROT SERPL-MCNC: 7.7 G/DL (ref 6–8.2)
PROT UR QL STRIP: NEGATIVE MG/DL
RBC # BLD AUTO: 4.63 M/UL (ref 4.2–5.4)
RBC # URNS HPF: ABNORMAL /HPF
RBC UR QL AUTO: NEGATIVE
SODIUM SERPL-SCNC: 135 MMOL/L (ref 135–145)
SP GR UR STRIP.AUTO: 1.03
UROBILINOGEN UR STRIP.AUTO-MCNC: 0.2 MG/DL
WBC # BLD AUTO: 17.9 K/UL (ref 4.8–10.8)
WBC #/AREA URNS HPF: ABNORMAL /HPF

## 2022-05-28 PROCEDURE — 700111 HCHG RX REV CODE 636 W/ 250 OVERRIDE (IP)

## 2022-05-28 PROCEDURE — 96376 TX/PRO/DX INJ SAME DRUG ADON: CPT

## 2022-05-28 PROCEDURE — 96365 THER/PROPH/DIAG IV INF INIT: CPT

## 2022-05-28 PROCEDURE — 99223 1ST HOSP IP/OBS HIGH 75: CPT | Mod: AI | Performed by: INTERNAL MEDICINE

## 2022-05-28 PROCEDURE — 700102 HCHG RX REV CODE 250 W/ 637 OVERRIDE(OP): Performed by: INTERNAL MEDICINE

## 2022-05-28 PROCEDURE — 700101 HCHG RX REV CODE 250: Performed by: INTERNAL MEDICINE

## 2022-05-28 PROCEDURE — 700105 HCHG RX REV CODE 258: Performed by: INTERNAL MEDICINE

## 2022-05-28 PROCEDURE — 700111 HCHG RX REV CODE 636 W/ 250 OVERRIDE (IP): Performed by: EMERGENCY MEDICINE

## 2022-05-28 PROCEDURE — 85025 COMPLETE CBC W/AUTO DIFF WBC: CPT

## 2022-05-28 PROCEDURE — 700105 HCHG RX REV CODE 258: Performed by: EMERGENCY MEDICINE

## 2022-05-28 PROCEDURE — 99285 EMERGENCY DEPT VISIT HI MDM: CPT

## 2022-05-28 PROCEDURE — 74177 CT ABD & PELVIS W/CONTRAST: CPT

## 2022-05-28 PROCEDURE — 36415 COLL VENOUS BLD VENIPUNCTURE: CPT

## 2022-05-28 PROCEDURE — A9270 NON-COVERED ITEM OR SERVICE: HCPCS | Performed by: INTERNAL MEDICINE

## 2022-05-28 PROCEDURE — 96375 TX/PRO/DX INJ NEW DRUG ADDON: CPT

## 2022-05-28 PROCEDURE — 700111 HCHG RX REV CODE 636 W/ 250 OVERRIDE (IP): Performed by: INTERNAL MEDICINE

## 2022-05-28 PROCEDURE — 74022 RADEX COMPL AQT ABD SERIES: CPT

## 2022-05-28 PROCEDURE — 87040 BLOOD CULTURE FOR BACTERIA: CPT

## 2022-05-28 PROCEDURE — 83605 ASSAY OF LACTIC ACID: CPT

## 2022-05-28 PROCEDURE — 700101 HCHG RX REV CODE 250: Performed by: EMERGENCY MEDICINE

## 2022-05-28 PROCEDURE — 700117 HCHG RX CONTRAST REV CODE 255: Performed by: EMERGENCY MEDICINE

## 2022-05-28 PROCEDURE — 770006 HCHG ROOM/CARE - MED/SURG/GYN SEMI*

## 2022-05-28 PROCEDURE — 80053 COMPREHEN METABOLIC PANEL: CPT

## 2022-05-28 PROCEDURE — 81001 URINALYSIS AUTO W/SCOPE: CPT

## 2022-05-28 PROCEDURE — 83690 ASSAY OF LIPASE: CPT

## 2022-05-28 PROCEDURE — 87086 URINE CULTURE/COLONY COUNT: CPT

## 2022-05-28 PROCEDURE — 94760 N-INVAS EAR/PLS OXIMETRY 1: CPT

## 2022-05-28 RX ORDER — AMOXICILLIN 250 MG
2 CAPSULE ORAL 2 TIMES DAILY
Status: DISCONTINUED | OUTPATIENT
Start: 2022-05-28 | End: 2022-05-31 | Stop reason: HOSPADM

## 2022-05-28 RX ORDER — METRONIDAZOLE 500 MG/100ML
500 INJECTION, SOLUTION INTRAVENOUS EVERY 8 HOURS
Status: DISCONTINUED | OUTPATIENT
Start: 2022-05-28 | End: 2022-05-29

## 2022-05-28 RX ORDER — CETIRIZINE HYDROCHLORIDE 10 MG/1
10 TABLET ORAL DAILY
Status: DISCONTINUED | OUTPATIENT
Start: 2022-05-28 | End: 2022-05-31 | Stop reason: HOSPADM

## 2022-05-28 RX ORDER — POLYETHYLENE GLYCOL 3350 17 G/17G
1 POWDER, FOR SOLUTION ORAL DAILY
Status: DISCONTINUED | OUTPATIENT
Start: 2022-05-28 | End: 2022-05-31 | Stop reason: HOSPADM

## 2022-05-28 RX ORDER — METRONIDAZOLE 500 MG/100ML
500 INJECTION, SOLUTION INTRAVENOUS ONCE
Status: COMPLETED | OUTPATIENT
Start: 2022-05-28 | End: 2022-05-28

## 2022-05-28 RX ORDER — HYDRALAZINE HYDROCHLORIDE 20 MG/ML
10 INJECTION INTRAMUSCULAR; INTRAVENOUS EVERY 4 HOURS PRN
Status: DISCONTINUED | OUTPATIENT
Start: 2022-05-28 | End: 2022-05-31 | Stop reason: HOSPADM

## 2022-05-28 RX ORDER — SODIUM CHLORIDE, SODIUM LACTATE, POTASSIUM CHLORIDE, AND CALCIUM CHLORIDE .6; .31; .03; .02 G/100ML; G/100ML; G/100ML; G/100ML
500 INJECTION, SOLUTION INTRAVENOUS
Status: DISCONTINUED | OUTPATIENT
Start: 2022-05-28 | End: 2022-05-31 | Stop reason: HOSPADM

## 2022-05-28 RX ORDER — ONDANSETRON 4 MG/1
4 TABLET, ORALLY DISINTEGRATING ORAL EVERY 4 HOURS PRN
Status: DISCONTINUED | OUTPATIENT
Start: 2022-05-28 | End: 2022-05-31 | Stop reason: HOSPADM

## 2022-05-28 RX ORDER — PROMETHAZINE HYDROCHLORIDE 25 MG/1
12.5-25 SUPPOSITORY RECTAL EVERY 4 HOURS PRN
Status: DISCONTINUED | OUTPATIENT
Start: 2022-05-28 | End: 2022-05-31 | Stop reason: HOSPADM

## 2022-05-28 RX ORDER — OXYCODONE HYDROCHLORIDE 5 MG/1
5 TABLET ORAL
Status: DISCONTINUED | OUTPATIENT
Start: 2022-05-28 | End: 2022-05-31 | Stop reason: HOSPADM

## 2022-05-28 RX ORDER — ONDANSETRON 2 MG/ML
4 INJECTION INTRAMUSCULAR; INTRAVENOUS ONCE
Status: COMPLETED | OUTPATIENT
Start: 2022-05-28 | End: 2022-05-28

## 2022-05-28 RX ORDER — PROCHLORPERAZINE EDISYLATE 5 MG/ML
5-10 INJECTION INTRAMUSCULAR; INTRAVENOUS EVERY 4 HOURS PRN
Status: DISCONTINUED | OUTPATIENT
Start: 2022-05-28 | End: 2022-05-31 | Stop reason: HOSPADM

## 2022-05-28 RX ORDER — HYDROMORPHONE HYDROCHLORIDE 1 MG/ML
1 INJECTION, SOLUTION INTRAMUSCULAR; INTRAVENOUS; SUBCUTANEOUS ONCE
Status: COMPLETED | OUTPATIENT
Start: 2022-05-28 | End: 2022-05-28

## 2022-05-28 RX ORDER — PROMETHAZINE HYDROCHLORIDE 25 MG/1
12.5-25 TABLET ORAL EVERY 4 HOURS PRN
Status: DISCONTINUED | OUTPATIENT
Start: 2022-05-28 | End: 2022-05-31 | Stop reason: HOSPADM

## 2022-05-28 RX ORDER — ENOXAPARIN SODIUM 100 MG/ML
40 INJECTION SUBCUTANEOUS DAILY
Status: DISCONTINUED | OUTPATIENT
Start: 2022-05-28 | End: 2022-05-31 | Stop reason: HOSPADM

## 2022-05-28 RX ORDER — OXYCODONE HYDROCHLORIDE 10 MG/1
10 TABLET ORAL
Status: DISCONTINUED | OUTPATIENT
Start: 2022-05-28 | End: 2022-05-31 | Stop reason: HOSPADM

## 2022-05-28 RX ORDER — SODIUM CHLORIDE 9 MG/ML
INJECTION, SOLUTION INTRAVENOUS CONTINUOUS
Status: DISCONTINUED | OUTPATIENT
Start: 2022-05-28 | End: 2022-05-29

## 2022-05-28 RX ORDER — HYDROMORPHONE HYDROCHLORIDE 1 MG/ML
0.5 INJECTION, SOLUTION INTRAMUSCULAR; INTRAVENOUS; SUBCUTANEOUS
Status: DISCONTINUED | OUTPATIENT
Start: 2022-05-28 | End: 2022-05-29

## 2022-05-28 RX ORDER — CEFTRIAXONE 2 G/1
2 INJECTION, POWDER, FOR SOLUTION INTRAMUSCULAR; INTRAVENOUS ONCE
Status: COMPLETED | OUTPATIENT
Start: 2022-05-28 | End: 2022-05-28

## 2022-05-28 RX ORDER — SODIUM CHLORIDE, SODIUM LACTATE, POTASSIUM CHLORIDE, CALCIUM CHLORIDE 600; 310; 30; 20 MG/100ML; MG/100ML; MG/100ML; MG/100ML
1000 INJECTION, SOLUTION INTRAVENOUS ONCE
Status: COMPLETED | OUTPATIENT
Start: 2022-05-28 | End: 2022-05-28

## 2022-05-28 RX ORDER — ONDANSETRON 2 MG/ML
4 INJECTION INTRAMUSCULAR; INTRAVENOUS EVERY 4 HOURS PRN
Status: DISCONTINUED | OUTPATIENT
Start: 2022-05-28 | End: 2022-05-31 | Stop reason: HOSPADM

## 2022-05-28 RX ORDER — POLYETHYLENE GLYCOL 3350 17 G/17G
1 POWDER, FOR SOLUTION ORAL
Status: DISCONTINUED | OUTPATIENT
Start: 2022-05-28 | End: 2022-05-31 | Stop reason: HOSPADM

## 2022-05-28 RX ORDER — FAMOTIDINE 20 MG/1
20 TABLET, FILM COATED ORAL 2 TIMES DAILY
Status: DISCONTINUED | OUTPATIENT
Start: 2022-05-28 | End: 2022-05-31 | Stop reason: HOSPADM

## 2022-05-28 RX ORDER — BISACODYL 10 MG
10 SUPPOSITORY, RECTAL RECTAL
Status: DISCONTINUED | OUTPATIENT
Start: 2022-05-28 | End: 2022-05-31 | Stop reason: HOSPADM

## 2022-05-28 RX ADMIN — HYDROMORPHONE HYDROCHLORIDE 0.5 MG: 1 INJECTION, SOLUTION INTRAMUSCULAR; INTRAVENOUS; SUBCUTANEOUS at 13:36

## 2022-05-28 RX ADMIN — PROMETHAZINE HYDROCHLORIDE 25 MG: 25 TABLET ORAL at 11:33

## 2022-05-28 RX ADMIN — HYDROMORPHONE HYDROCHLORIDE 0.5 MG: 1 INJECTION, SOLUTION INTRAMUSCULAR; INTRAVENOUS; SUBCUTANEOUS at 07:29

## 2022-05-28 RX ADMIN — CEFTRIAXONE SODIUM 2 G: 2 INJECTION, POWDER, FOR SOLUTION INTRAMUSCULAR; INTRAVENOUS at 06:34

## 2022-05-28 RX ADMIN — ONDANSETRON HYDROCHLORIDE 4 MG: 2 SOLUTION INTRAMUSCULAR; INTRAVENOUS at 02:27

## 2022-05-28 RX ADMIN — IOHEXOL 80 ML: 350 INJECTION, SOLUTION INTRAVENOUS at 05:15

## 2022-05-28 RX ADMIN — ENOXAPARIN SODIUM 40 MG: 40 INJECTION SUBCUTANEOUS at 17:44

## 2022-05-28 RX ADMIN — ONDANSETRON HYDROCHLORIDE 4 MG: 2 SOLUTION INTRAMUSCULAR; INTRAVENOUS at 13:36

## 2022-05-28 RX ADMIN — CETIRIZINE HYDROCHLORIDE 10 MG: 10 TABLET, FILM COATED ORAL at 09:28

## 2022-05-28 RX ADMIN — SENNOSIDES AND DOCUSATE SODIUM 2 TABLET: 50; 8.6 TABLET ORAL at 09:28

## 2022-05-28 RX ADMIN — SODIUM CHLORIDE, POTASSIUM CHLORIDE, SODIUM LACTATE AND CALCIUM CHLORIDE 1000 ML: 600; 310; 30; 20 INJECTION, SOLUTION INTRAVENOUS at 02:40

## 2022-05-28 RX ADMIN — FAMOTIDINE 20 MG: 20 TABLET, FILM COATED ORAL at 17:44

## 2022-05-28 RX ADMIN — ONDANSETRON HYDROCHLORIDE 4 MG: 2 SOLUTION INTRAMUSCULAR; INTRAVENOUS at 10:03

## 2022-05-28 RX ADMIN — METRONIDAZOLE 500 MG: 500 INJECTION, SOLUTION INTRAVENOUS at 22:01

## 2022-05-28 RX ADMIN — METRONIDAZOLE 500 MG: 500 INJECTION, SOLUTION INTRAVENOUS at 13:36

## 2022-05-28 RX ADMIN — PROMETHAZINE HYDROCHLORIDE 25 MG: 25 TABLET ORAL at 22:32

## 2022-05-28 RX ADMIN — HYDROMORPHONE HYDROCHLORIDE 0.5 MG: 1 INJECTION, SOLUTION INTRAMUSCULAR; INTRAVENOUS; SUBCUTANEOUS at 10:03

## 2022-05-28 RX ADMIN — HYDROMORPHONE HYDROCHLORIDE 0.5 MG: 1 INJECTION, SOLUTION INTRAMUSCULAR; INTRAVENOUS; SUBCUTANEOUS at 22:32

## 2022-05-28 RX ADMIN — SODIUM CHLORIDE: 9 INJECTION, SOLUTION INTRAVENOUS at 09:29

## 2022-05-28 RX ADMIN — SODIUM CHLORIDE: 9 INJECTION, SOLUTION INTRAVENOUS at 22:01

## 2022-05-28 RX ADMIN — HYDROMORPHONE HYDROCHLORIDE 1 MG: 1 INJECTION, SOLUTION INTRAMUSCULAR; INTRAVENOUS; SUBCUTANEOUS at 04:02

## 2022-05-28 RX ADMIN — METRONIDAZOLE 500 MG: 500 INJECTION, SOLUTION INTRAVENOUS at 06:34

## 2022-05-28 RX ADMIN — FAMOTIDINE 20 MG: 20 TABLET, FILM COATED ORAL at 09:28

## 2022-05-28 RX ADMIN — SENNOSIDES AND DOCUSATE SODIUM 2 TABLET: 50; 8.6 TABLET ORAL at 17:44

## 2022-05-28 RX ADMIN — HYDROMORPHONE HYDROCHLORIDE 0.5 MG: 1 INJECTION, SOLUTION INTRAMUSCULAR; INTRAVENOUS; SUBCUTANEOUS at 17:45

## 2022-05-28 RX ADMIN — POLYETHYLENE GLYCOL 3350 1 PACKET: 17 POWDER, FOR SOLUTION ORAL at 09:28

## 2022-05-28 RX ADMIN — PROMETHAZINE HYDROCHLORIDE 25 MG: 25 TABLET ORAL at 17:44

## 2022-05-28 RX ADMIN — HYDROMORPHONE HYDROCHLORIDE 1 MG: 1 INJECTION, SOLUTION INTRAMUSCULAR; INTRAVENOUS; SUBCUTANEOUS at 02:38

## 2022-05-28 ASSESSMENT — ENCOUNTER SYMPTOMS
HALLUCINATIONS: 0
CONSTIPATION: 0
HEMOPTYSIS: 0
CHILLS: 0
BRUISES/BLEEDS EASILY: 0
HEARTBURN: 0
ABDOMINAL PAIN: 1
SHORTNESS OF BREATH: 0
WEIGHT LOSS: 0
EYES NEGATIVE: 1
FEVER: 0
COUGH: 0
FLANK PAIN: 0
PALPITATIONS: 0
FOCAL WEAKNESS: 0
VOMITING: 1
BACK PAIN: 0
BLURRED VISION: 0
TREMORS: 0
SPUTUM PRODUCTION: 0
DIARRHEA: 0
NAUSEA: 1
DOUBLE VISION: 0
NERVOUS/ANXIOUS: 0
WEAKNESS: 1
NECK PAIN: 0
PHOTOPHOBIA: 0
MYALGIAS: 0
HEADACHES: 0
CHILLS: 1
ORTHOPNEA: 0
POLYDIPSIA: 0
SPEECH CHANGE: 0

## 2022-05-28 ASSESSMENT — PATIENT HEALTH QUESTIONNAIRE - PHQ9
8. MOVING OR SPEAKING SO SLOWLY THAT OTHER PEOPLE COULD HAVE NOTICED. OR THE OPPOSITE, BEING SO FIGETY OR RESTLESS THAT YOU HAVE BEEN MOVING AROUND A LOT MORE THAN USUAL: NOT AT ALL
5. POOR APPETITE OR OVEREATING: NOT AT ALL
1. LITTLE INTEREST OR PLEASURE IN DOING THINGS: SEVERAL DAYS
3. TROUBLE FALLING OR STAYING ASLEEP OR SLEEPING TOO MUCH: SEVERAL DAYS
SUM OF ALL RESPONSES TO PHQ QUESTIONS 1-9: 7
2. FEELING DOWN, DEPRESSED, IRRITABLE, OR HOPELESS: SEVERAL DAYS
7. TROUBLE CONCENTRATING ON THINGS, SUCH AS READING THE NEWSPAPER OR WATCHING TELEVISION: SEVERAL DAYS
SUM OF ALL RESPONSES TO PHQ9 QUESTIONS 1 AND 2: 2
6. FEELING BAD ABOUT YOURSELF - OR THAT YOU ARE A FAILURE OR HAVE LET YOURSELF OR YOUR FAMILY DOWN: NOT AL ALL
4. FEELING TIRED OR HAVING LITTLE ENERGY: NEARLY EVERY DAY
9. THOUGHTS THAT YOU WOULD BE BETTER OFF DEAD, OR OF HURTING YOURSELF: NOT AT ALL

## 2022-05-28 ASSESSMENT — PAIN DESCRIPTION - PAIN TYPE
TYPE: ACUTE PAIN
TYPE: ACUTE PAIN

## 2022-05-28 ASSESSMENT — COGNITIVE AND FUNCTIONAL STATUS - GENERAL
CLIMB 3 TO 5 STEPS WITH RAILING: A LITTLE
SUGGESTED CMS G CODE MODIFIER MOBILITY: CI
HELP NEEDED FOR BATHING: A LITTLE
MOBILITY SCORE: 23
DRESSING REGULAR LOWER BODY CLOTHING: A LITTLE
SUGGESTED CMS G CODE MODIFIER DAILY ACTIVITY: CJ
DAILY ACTIVITIY SCORE: 21
DRESSING REGULAR UPPER BODY CLOTHING: A LITTLE

## 2022-05-28 ASSESSMENT — LIFESTYLE VARIABLES
TOTAL SCORE: 0
EVER FELT BAD OR GUILTY ABOUT YOUR DRINKING: NO
DO YOU DRINK ALCOHOL: NO
TOTAL SCORE: 0
DOES PATIENT WANT TO STOP DRINKING: NO
TOTAL SCORE: 0
SUBSTANCE_ABUSE: 0
EVER HAD A DRINK FIRST THING IN THE MORNING TO STEADY YOUR NERVES TO GET RID OF A HANGOVER: NO
HAVE YOU EVER FELT YOU SHOULD CUT DOWN ON YOUR DRINKING: NO
CONSUMPTION TOTAL: INCOMPLETE
HAVE PEOPLE ANNOYED YOU BY CRITICIZING YOUR DRINKING: NO
ALCOHOL_USE: YES

## 2022-05-28 ASSESSMENT — FIBROSIS 4 INDEX: FIB4 SCORE: 0.28

## 2022-05-28 NOTE — ED TRIAGE NOTES
"Chief Complaint   Patient presents with   • Abdominal Pain     Pt reports that she had her appendix removed here 6 days ago. She reports abdominal pain that is increasing today. Pt was discharged from this facility yesterday at 1800. Pt also endorses nausea and vomiting but denies diarrhea or fevers. She continues to have n/v despite prescription Zofran which she last took at 2000 and percocet at 2200 with no relief.     Pt to triage in wheelchair. Pt educated on triage process and told to alert staff of any changes or concerns.    /82   Pulse (!) 122   Temp 35.9 °C (96.6 °F) (Temporal)   Resp 16   Ht 1.727 m (5' 8\")   Wt 56.7 kg (125 lb)   LMP 05/06/2022 (Approximate)   SpO2 95%   BMI 19.01 kg/m²     "

## 2022-05-28 NOTE — ED PROVIDER NOTES
ED Provider Note    CHIEF COMPLAINT  Chief Complaint   Patient presents with   • Abdominal Pain     Pt reports that she had her appendix removed here 6 days ago. She reports abdominal pain that is increasing today. Pt was discharged from this facility yesterday at 1800. Pt also endorses nausea and vomiting but denies diarrhea or fevers. She continues to have n/v despite prescription Zofran which she last took at 2000 and percocet at 2200 with no relief.       LUDIN Khan is a 21 y.o. female who presents for evaluation of abdominal pain.  Patient notes she has had pain constantly since yesterday late morning.  She was seen in the emergency department yesterday around 12 PM and had a thorough evaluation including labs, ultrasound, and CT imaging.  Additionally, she had a surgical consultation by Dr. Rodríguez who personally evaluated her in the emergency department.  Despite this she has continued to have nausea and vomiting and has had no relief of the pain.    REVIEW OF SYSTEMS  Constitutional: No fevers or chills  Skin: No rashes  HEENT: No sore throat, runny nose  Neck: No neck pain  Chest: No pain  Pulm: No shortness of breath, or cough  Gastrointestinal: No diarrhea, constipation, bloating, melena, or hematochezia   Genitourinary: No dysuria or hematuria.  No vaginal bleeding or vaginal discharge.  Musculoskeletal: No pain, swelling, weakness  Neurologic: No sensory or motor changes.  Heme: No bleeding or bruising problems.   Immuno: No hx of recurrent infections    PAST FAM HISTORY  History reviewed. No pertinent family history.    PAST MEDICAL HISTORY   has a past medical history of Celiac disease.    SOCIAL HISTORY  Social History     Tobacco Use   • Smoking status: Never Smoker   • Smokeless tobacco: Never Used   Substance and Sexual Activity   • Alcohol use: Yes   • Drug use: Yes     Comment: THC   • Sexual activity: Not on file       SURGICAL HISTORY   has a past surgical history that includes  "lap,appendectomy (5/21/2022).    CURRENT MEDICATIONS  Home Medications    **Home medications have not yet been reviewed for this encounter**         ALLERGIES  Allergies   Allergen Reactions   • Vicodin Hp [Apap-Fd&C Blue #1-Hydrocodone] Hives       PHYSICAL EXAM  VITAL SIGNS: /78   Pulse (!) 103   Temp 35.9 °C (96.6 °F) (Temporal)   Resp 16   Ht 1.727 m (5' 8\")   Wt 56.7 kg (125 lb)   LMP 05/06/2022 (Approximate)   SpO2 98%   BMI 19.01 kg/m²    Gen: Alert, tearful, grimacing  HEENT: No signs of trauma, Bilateral external ears normal, Nose normal. Conjunctiva normal, Non-icteric.   Cardiovascular: Tachycardia with regular rhythm, no murmurs.  Capillary refill less than 3 seconds to all extremities, 2+ distal pulses.  Thorax & Lungs: Normal breath sounds, No respiratory distress, No wheezing bilateral chest rise  Abdomen: Bowel sounds normal, diffuse severe tenderness, voluntary guarding noted  Skin: Warm, Dry   Extremities: Intact distal pulses, No edema  Neurologic: Alert , no facial droop, grossly normal coordination and strength  Psychiatric: Affect anxious      LABS  Results for orders placed or performed during the hospital encounter of 05/28/22   CBC WITH DIFFERENTIAL   Result Value Ref Range    WBC 17.9 (H) 4.8 - 10.8 K/uL    RBC 4.63 4.20 - 5.40 M/uL    Hemoglobin 14.4 12.0 - 16.0 g/dL    Hematocrit 40.5 37.0 - 47.0 %    MCV 87.5 81.4 - 97.8 fL    MCH 31.1 27.0 - 33.0 pg    MCHC 35.6 (H) 33.6 - 35.0 g/dL    RDW 38.8 35.9 - 50.0 fL    Platelet Count 413 164 - 446 K/uL    MPV 10.2 9.0 - 12.9 fL    Neutrophils-Polys 83.80 (H) 44.00 - 72.00 %    Lymphocytes 10.90 (L) 22.00 - 41.00 %    Monocytes 4.50 0.00 - 13.40 %    Eosinophils 0.10 0.00 - 6.90 %    Basophils 0.30 0.00 - 1.80 %    Immature Granulocytes 0.40 0.00 - 0.90 %    Nucleated RBC 0.00 /100 WBC    Neutrophils (Absolute) 14.98 (H) 2.00 - 7.15 K/uL    Lymphs (Absolute) 1.96 1.00 - 4.80 K/uL    Monos (Absolute) 0.81 0.00 - 0.85 K/uL    Eos " (Absolute) 0.01 0.00 - 0.51 K/uL    Baso (Absolute) 0.06 0.00 - 0.12 K/uL    Immature Granulocytes (abs) 0.08 0.00 - 0.11 K/uL    NRBC (Absolute) 0.00 K/uL   COMP METABOLIC PANEL   Result Value Ref Range    Sodium 135 135 - 145 mmol/L    Potassium 4.1 3.6 - 5.5 mmol/L    Chloride 102 96 - 112 mmol/L    Co2 16 (L) 20 - 33 mmol/L    Anion Gap 17.0 (H) 7.0 - 16.0    Glucose 157 (H) 65 - 99 mg/dL    Bun 11 8 - 22 mg/dL    Creatinine 0.59 0.50 - 1.40 mg/dL    Calcium 9.7 8.5 - 10.5 mg/dL    AST(SGOT) 22 12 - 45 U/L    ALT(SGPT) 16 2 - 50 U/L    Alkaline Phosphatase 69 30 - 99 U/L    Total Bilirubin 0.4 0.1 - 1.5 mg/dL    Albumin 4.8 3.2 - 4.9 g/dL    Total Protein 7.7 6.0 - 8.2 g/dL    Globulin 2.9 1.9 - 3.5 g/dL    A-G Ratio 1.7 g/dL   LIPASE   Result Value Ref Range    Lipase 27 11 - 82 U/L   LACTIC ACID   Result Value Ref Range    Lactic Acid 4.6 (HH) 0.5 - 2.0 mmol/L   URINALYSIS (UA)    Specimen: Urine   Result Value Ref Range    Color Yellow     Character Clear     Specific Gravity 1.034 <1.035    Ph 7.0 5.0 - 8.0    Glucose Negative Negative mg/dL    Ketones 40 (A) Negative mg/dL    Protein Negative Negative mg/dL    Bilirubin Negative Negative    Urobilinogen, Urine 0.2 Negative    Nitrite Negative Negative    Leukocyte Esterase Trace (A) Negative    Occult Blood Negative Negative    Micro Urine Req Microscopic    ESTIMATED GFR   Result Value Ref Range    GFR (CKD-EPI) 129 >60 mL/min/1.73 m 2   URINE MICROSCOPIC (W/UA)   Result Value Ref Range    WBC 2-5 /hpf    RBC 2-5 (A) /hpf    Bacteria Few (A) None /hpf    Epithelial Cells Few /hpf    Hyaline Cast 0-2 /lpf   LACTIC ACID   Result Value Ref Range    Lactic Acid 1.8 0.5 - 2.0 mmol/L       RADIOLOGY  CT-ABDOMEN-PELVIS WITH   Final Result         1.  Enhancing right ovarian cyst with irregular margins, appearance most compatible with involuting cyst.   2.  Nonvisualization of the appendix, cannot definitively evaluate for and/or exclude appendicitis   3.   Moderate size pelvic free fluid collection.   4.  Hepatomegaly      DX-ABDOMEN COMPLETE WITH AP OR PA CXR   Final Result         1.  No acute cardiopulmonary disease is evident.   2.  Moderate stool in the colon suggests changes of constipation, otherwise nonspecific bowel gas pattern.        Critical Care Note  Upon my evaluation, this patient had high probability of imminent and life-threatening deterioration due to severe abdominal in the postop setting, SIRS criteria, which required my direct attention, intervention, and personal management. I personally provided 35 minutes of critical care time exclusive of time spent on separately billable procedures. Time includes review of laboratory data, radiology results, discussion with consultants, and monitoring for potential decompensation.     HYDRATION: Based on the patient's presentation of Acute Vomiting, Inability to take oral fluids, Sepsis and Tachycardia the patient was given IV fluids. IV Hydration was used because oral hydration was not adequate alone. Upon recheck following hydration, the patient was stable.    COURSE & MEDICAL DECISION MAKING  Patient arrives for evaluation of persistent and severe abdominal pain in the setting of a recent appendectomy.  Patient was seen and thoroughly evaluated earlier today but she will likely need another thorough evaluation, including CT.  General surgery recommended CT with IV and p.o. contrast if possible.  Unlikely that p.o. contrast will be possible given the patient's nausea and vomiting however.  Patient states understanding of the need for repeat scanning.  She will be treated empirically with fluids and IV pain medication as well as nausea medication.      5:20 AM  Evaluated patient at bedside, she is much calmer now and alert.  She states understanding of the findings.  She feels her pain is controlled but she is still in pain and nauseous  5:30 AM  Case discussed with Dr. Wallace, general surgery, who reviewed  the patient's diagnostics and evaluated the patient.  At this point there is no clear reason to take the patient back to the OR from his perspective although it is concerning that she meets SIRS criteria and is in as much pain that she is.  Case was also discussed with the on-call gynecologist who felt that the cyst was likely not the source of the pain and, given that she was not anemic, there was no urgent need for gynecology consultation.  Case was then discussed with the hospitalist who agreed to evaluate the patient in the emergency department for admission for pain control and empiric antibiotics.  General surgery will follow along while the patient is admitted but gynecology will need to be formally consulted by the hospitalist service if they feel it is necessary.    FINAL IMPRESSION  1. SIRS (systemic inflammatory response syndrome) (HCC)    2. Intractable generalized abdominal pain        Electronically signed by: Lv Winchester M.D., 5/28/2022 3:10 AM

## 2022-05-28 NOTE — ED NOTES
Patient called d/t feeling nauseous. ERP notified and another dose of IV zofran ordered and given. Patient tolerated well.

## 2022-05-28 NOTE — ED NOTES
Pt had nausea with emesis episode, states that she in 10/10 pain constantly and the nausea comes when the pain is unbearable. Pt medicated.

## 2022-05-28 NOTE — DISCHARGE INSTRUCTIONS
You were seen in the Emergency Department for abdominal pain.    Labs were completed without significant acute abnormalities.  CT and US show an ovarian cyst and as well as constipation.    Please use 1,000mg of tylenol or 600mg of ibuprofen every 6 hours as needed for pain.  Take stronger pain medication as needed.  Please continue stool softeners and daily MiraLAX for the next week.  Drink plenty of fluids.    Please follow up with your primary care physician and general surgery.  You will need follow-up with gynecology as above in 6 to 12 weeks for repeat ultrasound to her ensure that cyst has resolved.    Return to the Emergency Department with fevers, uncontrolled pain, vomiting, lightheadedness or fainting, or other concerns.

## 2022-05-28 NOTE — PROGRESS NOTES
Hospital Medicine Daily Progress Note    Date of Service  5/28/2022    Chief Complaint  Khushboo Khan is a 21 y.o. female admitted 5/28/2022 with abdominal pain    Hospital Course  A 21-year-old woman with h/o celiac disease, appendectomy 6 days ago without obvious complications laparoscopically presented with worsening abdominal pain, nausea, vomiting, inability to tolerate oral intake. Abdominal pain is in upper abdomen, radiating to the lower abdomen, severe, cramping, without alleviating aggravating factors, constant.  CT of the abdomen pelvis was done today, showing enhancing right ovarian cyst with irregular margins, nonvisualized appendix, moderate pelvic free fluid collection, hepatomegaly.  Patient met sepsis criteria, started on fluids and antibiotics.  General surgery and OB/GYN consulted.  Etiology of fever and abdominal pain remains unclear at this time.  No immediate plan for surgery.      Interval Problem Update  Patient report generalized abdominal pain 8/10, nausea, vomiting, chills, chest pain - lower ribs, SOB.   Afebrile, hemodynamically stable. On room air.  WBC 17.9.  High anion gap metabolic acidosis: CO2 16, AG 17. Lactic acid 4.6->1.8 normalized.  Continue IV ceftriaxone+flagyl  Gynecology consulted.    I have personally seen and examined the patient at bedside. I discussed the plan of care with patient, family and bedside RN.    Consultants/Specialty  general surgery and gynecology    Code Status  Full Code    Disposition  Patient is not medically cleared for discharge.   Anticipate discharge to to home with close outpatient follow-up.  I have placed the appropriate orders for post-discharge needs.    Review of Systems  Review of Systems   Constitutional: Positive for chills and malaise/fatigue. Negative for fever.   HENT: Negative.    Eyes: Negative.    Respiratory: Negative for cough and shortness of breath.    Cardiovascular: Positive for chest pain. Negative for leg swelling.    Gastrointestinal: Positive for abdominal pain, nausea and vomiting. Negative for constipation and diarrhea.   Genitourinary: Negative for dysuria.   Musculoskeletal: Negative for myalgias.   Skin: Negative for rash.   Neurological: Positive for weakness.        Physical Exam  Temp:  [35.9 °C (96.6 °F)-36.5 °C (97.7 °F)] 35.9 °C (96.6 °F)  Pulse:  [] 103  Resp:  [16-18] 16  BP: (102-142)/(59-97) 124/78  SpO2:  [95 %-100 %] 98 %    Physical Exam  Vitals and nursing note reviewed.   Constitutional:       Appearance: She is ill-appearing.   HENT:      Head: Normocephalic and atraumatic.      Nose: Nose normal.      Mouth/Throat:      Mouth: Mucous membranes are moist.      Pharynx: Oropharynx is clear.   Eyes:      Conjunctiva/sclera: Conjunctivae normal.      Pupils: Pupils are equal, round, and reactive to light.   Cardiovascular:      Rate and Rhythm: Regular rhythm. Tachycardia present.   Pulmonary:      Effort: Pulmonary effort is normal. No respiratory distress.      Breath sounds: No wheezing or rales.   Abdominal:      General: Abdomen is flat. Bowel sounds are normal. There is no distension.      Tenderness: There is abdominal tenderness. There is no guarding.   Musculoskeletal:         General: Normal range of motion.      Cervical back: Normal range of motion.   Skin:     General: Skin is warm.   Neurological:      General: No focal deficit present.      Mental Status: She is alert and oriented to person, place, and time.         Fluids    Intake/Output Summary (Last 24 hours) at 5/28/2022 0723  Last data filed at 5/28/2022 0420  Gross per 24 hour   Intake 1000 ml   Output --   Net 1000 ml       Laboratory  Recent Labs     05/27/22  1132 05/28/22  0218   WBC 7.7 17.9*   RBC 5.01 4.63   HEMOGLOBIN 15.5 14.4   HEMATOCRIT 45.4 40.5   MCV 90.6 87.5   MCH 30.9 31.1   MCHC 34.1 35.6*   RDW 39.8 38.8   PLATELETCT 385 413   MPV 10.1 10.2     Recent Labs     05/27/22  1132 05/28/22  0218   SODIUM 137 135    POTASSIUM 4.6 4.1   CHLORIDE 102 102   CO2 22 16*   GLUCOSE 104* 157*   BUN 12 11   CREATININE 0.64 0.59   CALCIUM 9.8 9.7                   Imaging  CT-ABDOMEN-PELVIS WITH   Final Result         1.  Enhancing right ovarian cyst with irregular margins, appearance most compatible with involuting cyst.   2.  Nonvisualization of the appendix, cannot definitively evaluate for and/or exclude appendicitis   3.  Moderate size pelvic free fluid collection.   4.  Hepatomegaly      DX-ABDOMEN COMPLETE WITH AP OR PA CXR   Final Result         1.  No acute cardiopulmonary disease is evident.   2.  Moderate stool in the colon suggests changes of constipation, otherwise nonspecific bowel gas pattern.           Assessment/Plan  * Abdominal pain- (present on admission)  Assessment & Plan  Status post laparoscopic appendectomy, uncomplicated 6 days ago  No definite peritoneal signs on exam  CT of the abdomen and pelvis with contrast: Enhancing right ovarian cyst with irregular margins, most compatible with involuting cyst.  Nonvisualized appendix.  Moderate size pelvic free fluid collection  General surgery and gynecology consulted by ERP  No immediate plan for surgery  Continue antibiotics, fluids.  N.p.o. for now  Serial abdominal exam    Sepsis (HCC)  Assessment & Plan  This is Sepsis Present on admission  SIRS criteria identified on my evaluation include: Tachycardia, with heart rate greater than 90 BPM and Leukocytosis, with WBC greater than 12,000  Source is intra-abdominal infection  Sepsis protocol initiated  Fluid resuscitation ordered per protocol  Crystalloid Fluid Administration: Fluid resuscitation ordered per standard protocol - 30 mL/kg per current or ideal body weight  IV antibiotics as appropriate for source of sepsis  Reassessment: I have reassessed the patient's hemodynamic status             VTE prophylaxis: enoxaparin ppx    I have performed a physical exam and reviewed and updated ROS and Plan today  (5/28/2022). In review of yesterday's note (5/27/2022), there are no changes except as documented above.

## 2022-05-28 NOTE — ASSESSMENT & PLAN NOTE
This is Sepsis Present on admission  SIRS criteria identified on my evaluation include: Tachycardia, with heart rate greater than 90 BPM and Leukocytosis, with WBC greater than 12,000  Source is intra-abdominal infection vs. Dehydration vs. Stress   Sepsis protocol initiated  Fluid resuscitation ordered per protocol  Crystalloid Fluid Administration: Fluid resuscitation ordered per standard protocol - 30 mL/kg per current or ideal body weight  IV antibiotics as appropriate for source of sepsis  Reassessment: I have reassessed the patient's hemodynamic status  Follow up culture

## 2022-05-28 NOTE — ED NOTES
Transport at bedside for transfer of pt to [facility].  All belongings with pt.

## 2022-05-28 NOTE — HOSPITAL COURSE
This is a 21 year old female with PMHx of celiac disease with recent appendectomy on  05/21 1) with what ever biologic who was admitted on 05/28/2022 with intractable nausea, vomiting and abdominal pain.    CT of the abdomen, pelvis x 2 and transvaginal US noted enhancing 2.4cm right ovarian cyst with irregular margins, nonvisualized appendix, moderate pelvic free fluid collection, hepatomegaly. GYN consulted, recommend outpatient follow up.  Urine analysis x2 negative for infection.  No evidence of hydronephrosis, pyelonephritis, colitis on CT imaging.  Patient was evaluated by general surgery, no concerns, surgical sites are healing well.    Patient noted to have constipation, started on aggressive bowel regimen.  Patient had adequate bowel movements.  Continues with left lower quadrant abdominal pain, lesser in severity however still present.  Patient is able to tolerate p.o. intake with no further nausea or vomiting.  Patient is urinating well and adequate bowel movements.  Patient has history of celiac disease.  Asked patient to follow-up with gastroenterology and GYN outpatient.

## 2022-05-28 NOTE — ED NOTES
Report rec'd from ROB Zuluaga.  Pt requesting pain medications.  Contacted pharmacy for verification of medications.

## 2022-05-28 NOTE — H&P
Hospital Medicine History & Physical Note    Date of Service  5/28/2022    Primary Care Physician  Scott Hamilton D.O.    Consultants  General Surgery Dr Wallace    Code Status  Full Code    Chief Complaint  Chief Complaint   Patient presents with   • Abdominal Pain     Pt reports that she had her appendix removed here 6 days ago. She reports abdominal pain that is increasing today. Pt was discharged from this facility yesterday at 1800. Pt also endorses nausea and vomiting but denies diarrhea or fevers. She continues to have n/v despite prescription Zofran which she last took at 2000 and percocet at 2200 with no relief.       History of Presenting Illness  Khushboo Khan is a 21 y.o. female with past medical history of celiac disease, appendectomy 6 days ago without obvious complications laparoscopically who presented 5/28/2022 with complaints of worsening of abdominal pain, nausea, vomiting, inability to tolerate oral intake.  Abdominal pain is in upper abdomen, radiating to the lower abdomen, severe, cramping, without alleviating aggravating factors, Constant.  CT of the abdomen pelvis was done today, showing enhancing right ovarian cyst with irregular margins, nonvisualized appendix, moderate pelvic free fluid collection, hepatomegaly.  Patient met sepsis criteria, started on fluids and antibiotics.  General surgery and OB/GYN consulted.  Etiology of fever and abdominal pain remains unclear at this time.  No immediate plan for surgery.      I discussed the plan of care with patient.    Review of Systems  Review of Systems   Constitutional: Negative for chills, fever and weight loss.   HENT: Negative for ear pain, hearing loss and tinnitus.    Eyes: Negative for blurred vision, double vision and photophobia.   Respiratory: Negative for cough, hemoptysis and sputum production.    Cardiovascular: Negative for chest pain, palpitations and orthopnea.   Gastrointestinal: Positive for abdominal pain, nausea and  vomiting. Negative for heartburn.   Genitourinary: Negative for dysuria, flank pain, frequency and hematuria.   Musculoskeletal: Negative for back pain, joint pain and neck pain.   Skin: Negative for itching and rash.   Neurological: Negative for tremors, speech change, focal weakness and headaches.   Endo/Heme/Allergies: Negative for environmental allergies and polydipsia. Does not bruise/bleed easily.   Psychiatric/Behavioral: Negative for hallucinations and substance abuse. The patient is not nervous/anxious.        Past Medical History   has a past medical history of Celiac disease.    Surgical History   has a past surgical history that includes pr lap,appendectomy (5/21/2022).     Family History     Family history reviewed with patient. There is no family history that is pertinent to the chief complaint.     Social History   reports that she has never smoked. She has never used smokeless tobacco. She reports current alcohol use. She reports current drug use.    Allergies  Allergies   Allergen Reactions   • Vicodin Hp [Apap-Fd&C Blue #1-Hydrocodone] Hives       Medications  Prior to Admission Medications   Prescriptions Last Dose Informant Patient Reported? Taking?   Cats Claw, Uncaria tomentosa, (CATS CLAW PO) 5/27/2022 at am Patient Yes No   Sig: Take 1 Tablet by mouth every day.   Prenatal Vit-Fe Fumarate-FA (PRENATAL VITAMIN PO) 5/27/2022 at Unknown time Patient Yes No   Sig: Take 1 Tablet by mouth every day.   acetaminophen (TYLENOL) 500 MG Tab >2 daus at unk Patient Yes No   Sig: Take 500-1,000 mg by mouth every 6 hours as needed. Indications: Pain   cetirizine (ZYRTEC) 10 MG Tab 5/27/2022 at am Patient Yes No   Sig: Take 10 mg by mouth every day.   oxyCODONE-acetaminophen (PERCOCET) 5-325 MG Tab 5/27/2022 at Unknown time Patient No No   Sig: Take 1 Tablet by mouth every four hours as needed for Severe Pain for up to 5 days.   polyethylene glycol/lytes (MIRALAX) 17 g Pack prn at prn Patient No No   Sig:  Take 1 Packet by mouth every day.      Facility-Administered Medications: None       Physical Exam  Temp:  [35.9 °C (96.6 °F)-36.5 °C (97.7 °F)] 35.9 °C (96.6 °F)  Pulse:  [] 103  Resp:  [16-18] 16  BP: (102-142)/(59-97) 124/78  SpO2:  [95 %-100 %] 98 %  Blood Pressure: 124/78   Temperature: 35.9 °C (96.6 °F)   Pulse: (!) 103   Respiration: 16   Pulse Oximetry: 98 %       Physical Exam  Vitals and nursing note reviewed.   Constitutional:       General: She is not in acute distress.     Appearance: Normal appearance.   HENT:      Head: Normocephalic and atraumatic.      Nose: Nose normal.      Mouth/Throat:      Mouth: Mucous membranes are moist.   Eyes:      Extraocular Movements: Extraocular movements intact.      Pupils: Pupils are equal, round, and reactive to light.   Cardiovascular:      Rate and Rhythm: Regular rhythm. Tachycardia present.   Pulmonary:      Effort: Pulmonary effort is normal.      Breath sounds: Normal breath sounds.   Abdominal:      General: Abdomen is flat. There is no distension.      Tenderness: There is generalized abdominal tenderness. There is no guarding or rebound.   Musculoskeletal:         General: No swelling or deformity. Normal range of motion.      Cervical back: Normal range of motion and neck supple.   Skin:     General: Skin is warm and dry.   Neurological:      General: No focal deficit present.      Mental Status: She is alert and oriented to person, place, and time.   Psychiatric:         Mood and Affect: Mood normal.         Behavior: Behavior normal.         Laboratory:  Recent Labs     05/27/22  1132 05/28/22 0218   WBC 7.7 17.9*   RBC 5.01 4.63   HEMOGLOBIN 15.5 14.4   HEMATOCRIT 45.4 40.5   MCV 90.6 87.5   MCH 30.9 31.1   MCHC 34.1 35.6*   RDW 39.8 38.8   PLATELETCT 385 413   MPV 10.1 10.2     Recent Labs     05/27/22  1132 05/28/22 0218   SODIUM 137 135   POTASSIUM 4.6 4.1   CHLORIDE 102 102   CO2 22 16*   GLUCOSE 104* 157*   BUN 12 11   CREATININE 0.64 0.59    CALCIUM 9.8 9.7     Recent Labs     05/27/22  1132 05/28/22  0218   ALTSGPT 17 16   ASTSGOT 19 22   ALKPHOSPHAT 67 69   TBILIRUBIN 0.3 0.4   LIPASE 26 27   GLUCOSE 104* 157*         No results for input(s): NTPROBNP in the last 72 hours.      No results for input(s): TROPONINT in the last 72 hours.    Imaging:  CT-ABDOMEN-PELVIS WITH   Final Result         1.  Enhancing right ovarian cyst with irregular margins, appearance most compatible with involuting cyst.   2.  Nonvisualization of the appendix, cannot definitively evaluate for and/or exclude appendicitis   3.  Moderate size pelvic free fluid collection.   4.  Hepatomegaly      DX-ABDOMEN COMPLETE WITH AP OR PA CXR   Final Result         1.  No acute cardiopulmonary disease is evident.   2.  Moderate stool in the colon suggests changes of constipation, otherwise nonspecific bowel gas pattern.          X-Ray:  I have personally reviewed the images and compared with prior images.    Assessment/Plan:  Justification for Admission Status  I anticipate this patient will require at least two midnights for appropriate medical management, necessitating inpatient admission because Sepsis    * Abdominal pain- (present on admission)  Assessment & Plan  Status post laparoscopic appendectomy, uncomplicated 6 days ago  No definite peritoneal signs on exam  CT of the abdomen and pelvis with contrast: Enhancing right ovarian cyst with irregular margins, most compatible with involuting cyst.  Nonvisualized appendix.  Moderate size pelvic free fluid collection  General surgery and gynecology consulted by ERP  No immediate plan for surgery  Continue antibiotics, fluids.  N.p.o. for now  Serial abdominal exam    Sepsis (HCC)  Assessment & Plan  This is Sepsis Present on admission  SIRS criteria identified on my evaluation include: Tachycardia, with heart rate greater than 90 BPM and Leukocytosis, with WBC greater than 12,000  Source is intra-abdominal infection  Sepsis protocol  initiated  Fluid resuscitation ordered per protocol  Crystalloid Fluid Administration: Fluid resuscitation ordered per standard protocol - 30 mL/kg per current or ideal body weight  IV antibiotics as appropriate for source of sepsis  Reassessment: I have reassessed the patient's hemodynamic status            VTE prophylaxis: enoxaparin ppx

## 2022-05-28 NOTE — ASSESSMENT & PLAN NOTE
Status post laparoscopic appendectomy, uncomplicated 6 days ago. No definite peritoneal signs on exam  Unclear etiology, ovarian cyst rupture?   CT of the abdomen and pelvis with contrast: Enhancing right ovarian cyst with irregular margins, most compatible with involuting cyst.  Nonvisualized appendix.  Moderate size pelvic free fluid collection  General surgery and gynecology consulted   No immediate plan for surgery  Clear diet, advance as tolerate  Avoid iv pain meds  Bowel protocol, OOB

## 2022-05-29 PROBLEM — D72.829 LEUKOCYTOSIS: Status: ACTIVE | Noted: 2022-05-29

## 2022-05-29 PROBLEM — N83.209 OVARIAN CYST: Status: ACTIVE | Noted: 2022-05-29

## 2022-05-29 LAB
ALBUMIN SERPL BCP-MCNC: 3.7 G/DL (ref 3.2–4.9)
ALBUMIN/GLOB SERPL: 1.7 G/DL
ALP SERPL-CCNC: 51 U/L (ref 30–99)
ALT SERPL-CCNC: 10 U/L (ref 2–50)
ANION GAP SERPL CALC-SCNC: 11 MMOL/L (ref 7–16)
AST SERPL-CCNC: 14 U/L (ref 12–45)
BASOPHILS # BLD AUTO: 1 % (ref 0–1.8)
BASOPHILS # BLD: 0.05 K/UL (ref 0–0.12)
BILIRUB SERPL-MCNC: 0.2 MG/DL (ref 0.1–1.5)
BUN SERPL-MCNC: 11 MG/DL (ref 8–22)
CALCIUM SERPL-MCNC: 8.5 MG/DL (ref 8.5–10.5)
CHLORIDE SERPL-SCNC: 107 MMOL/L (ref 96–112)
CO2 SERPL-SCNC: 21 MMOL/L (ref 20–33)
CREAT SERPL-MCNC: 0.65 MG/DL (ref 0.5–1.4)
EOSINOPHIL # BLD AUTO: 0.14 K/UL (ref 0–0.51)
EOSINOPHIL NFR BLD: 2.9 % (ref 0–6.9)
ERYTHROCYTE [DISTWIDTH] IN BLOOD BY AUTOMATED COUNT: 41.2 FL (ref 35.9–50)
GFR SERPLBLD CREATININE-BSD FMLA CKD-EPI: 126 ML/MIN/1.73 M 2
GLOBULIN SER CALC-MCNC: 2.2 G/DL (ref 1.9–3.5)
GLUCOSE SERPL-MCNC: 77 MG/DL (ref 65–99)
HCT VFR BLD AUTO: 34.2 % (ref 37–47)
HGB BLD-MCNC: 11.5 G/DL (ref 12–16)
IMM GRANULOCYTES # BLD AUTO: 0.01 K/UL (ref 0–0.11)
IMM GRANULOCYTES NFR BLD AUTO: 0.2 % (ref 0–0.9)
LYMPHOCYTES # BLD AUTO: 2.06 K/UL (ref 1–4.8)
LYMPHOCYTES NFR BLD: 42.5 % (ref 22–41)
MCH RBC QN AUTO: 30.9 PG (ref 27–33)
MCHC RBC AUTO-ENTMCNC: 33.6 G/DL (ref 33.6–35)
MCV RBC AUTO: 91.9 FL (ref 81.4–97.8)
MONOCYTES # BLD AUTO: 0.47 K/UL (ref 0–0.85)
MONOCYTES NFR BLD AUTO: 9.7 % (ref 0–13.4)
NEUTROPHILS # BLD AUTO: 2.12 K/UL (ref 2–7.15)
NEUTROPHILS NFR BLD: 43.7 % (ref 44–72)
NRBC # BLD AUTO: 0 K/UL
NRBC BLD-RTO: 0 /100 WBC
PLATELET # BLD AUTO: 254 K/UL (ref 164–446)
PMV BLD AUTO: 10.6 FL (ref 9–12.9)
POTASSIUM SERPL-SCNC: 3.4 MMOL/L (ref 3.6–5.5)
PROT SERPL-MCNC: 5.9 G/DL (ref 6–8.2)
RBC # BLD AUTO: 3.72 M/UL (ref 4.2–5.4)
SODIUM SERPL-SCNC: 139 MMOL/L (ref 135–145)
WBC # BLD AUTO: 4.9 K/UL (ref 4.8–10.8)

## 2022-05-29 PROCEDURE — 85025 COMPLETE CBC W/AUTO DIFF WBC: CPT

## 2022-05-29 PROCEDURE — 99233 SBSQ HOSP IP/OBS HIGH 50: CPT | Performed by: STUDENT IN AN ORGANIZED HEALTH CARE EDUCATION/TRAINING PROGRAM

## 2022-05-29 PROCEDURE — 700101 HCHG RX REV CODE 250: Performed by: INTERNAL MEDICINE

## 2022-05-29 PROCEDURE — A9270 NON-COVERED ITEM OR SERVICE: HCPCS | Performed by: INTERNAL MEDICINE

## 2022-05-29 PROCEDURE — 80053 COMPREHEN METABOLIC PANEL: CPT

## 2022-05-29 PROCEDURE — 99231 SBSQ HOSP IP/OBS SF/LOW 25: CPT | Mod: 24 | Performed by: NURSE PRACTITIONER

## 2022-05-29 PROCEDURE — 700111 HCHG RX REV CODE 636 W/ 250 OVERRIDE (IP): Performed by: INTERNAL MEDICINE

## 2022-05-29 PROCEDURE — A9270 NON-COVERED ITEM OR SERVICE: HCPCS | Performed by: NURSE PRACTITIONER

## 2022-05-29 PROCEDURE — A9270 NON-COVERED ITEM OR SERVICE: HCPCS | Performed by: STUDENT IN AN ORGANIZED HEALTH CARE EDUCATION/TRAINING PROGRAM

## 2022-05-29 PROCEDURE — 700105 HCHG RX REV CODE 258: Performed by: INTERNAL MEDICINE

## 2022-05-29 PROCEDURE — 700102 HCHG RX REV CODE 250 W/ 637 OVERRIDE(OP): Performed by: STUDENT IN AN ORGANIZED HEALTH CARE EDUCATION/TRAINING PROGRAM

## 2022-05-29 PROCEDURE — 770006 HCHG ROOM/CARE - MED/SURG/GYN SEMI*

## 2022-05-29 PROCEDURE — 36415 COLL VENOUS BLD VENIPUNCTURE: CPT

## 2022-05-29 PROCEDURE — 700102 HCHG RX REV CODE 250 W/ 637 OVERRIDE(OP): Performed by: NURSE PRACTITIONER

## 2022-05-29 PROCEDURE — 700102 HCHG RX REV CODE 250 W/ 637 OVERRIDE(OP): Performed by: INTERNAL MEDICINE

## 2022-05-29 RX ORDER — POTASSIUM CHLORIDE 20 MEQ/1
40 TABLET, EXTENDED RELEASE ORAL ONCE
Status: COMPLETED | OUTPATIENT
Start: 2022-05-29 | End: 2022-05-29

## 2022-05-29 RX ADMIN — CEFTRIAXONE SODIUM 2 G: 10 INJECTION, POWDER, FOR SOLUTION INTRAVENOUS at 06:02

## 2022-05-29 RX ADMIN — HYDROMORPHONE HYDROCHLORIDE 0.5 MG: 1 INJECTION, SOLUTION INTRAMUSCULAR; INTRAVENOUS; SUBCUTANEOUS at 12:09

## 2022-05-29 RX ADMIN — SENNOSIDES AND DOCUSATE SODIUM 2 TABLET: 50; 8.6 TABLET ORAL at 06:02

## 2022-05-29 RX ADMIN — METRONIDAZOLE 500 MG: 500 INJECTION, SOLUTION INTRAVENOUS at 06:02

## 2022-05-29 RX ADMIN — CETIRIZINE HYDROCHLORIDE 10 MG: 10 TABLET, FILM COATED ORAL at 06:03

## 2022-05-29 RX ADMIN — FAMOTIDINE 20 MG: 20 TABLET, FILM COATED ORAL at 17:03

## 2022-05-29 RX ADMIN — OXYCODONE HYDROCHLORIDE 10 MG: 10 TABLET ORAL at 09:59

## 2022-05-29 RX ADMIN — OXYCODONE HYDROCHLORIDE 10 MG: 10 TABLET ORAL at 19:02

## 2022-05-29 RX ADMIN — SENNOSIDES AND DOCUSATE SODIUM 2 TABLET: 50; 8.6 TABLET ORAL at 17:03

## 2022-05-29 RX ADMIN — PROMETHAZINE HYDROCHLORIDE 25 MG: 25 TABLET ORAL at 06:02

## 2022-05-29 RX ADMIN — ENOXAPARIN SODIUM 40 MG: 40 INJECTION SUBCUTANEOUS at 17:03

## 2022-05-29 RX ADMIN — FAMOTIDINE 20 MG: 20 TABLET, FILM COATED ORAL at 06:02

## 2022-05-29 RX ADMIN — MAGNESIUM HYDROXIDE 30 ML: 400 SUSPENSION ORAL at 13:20

## 2022-05-29 RX ADMIN — POTASSIUM CHLORIDE 40 MEQ: 1500 TABLET, EXTENDED RELEASE ORAL at 12:09

## 2022-05-29 RX ADMIN — SODIUM CHLORIDE: 9 INJECTION, SOLUTION INTRAVENOUS at 10:00

## 2022-05-29 RX ADMIN — HYDROMORPHONE HYDROCHLORIDE 0.5 MG: 1 INJECTION, SOLUTION INTRAMUSCULAR; INTRAVENOUS; SUBCUTANEOUS at 06:01

## 2022-05-29 ASSESSMENT — ENCOUNTER SYMPTOMS
WEAKNESS: 1
NAUSEA: 0
COUGH: 0
CHILLS: 1
EYES NEGATIVE: 1
CHILLS: 0
SHORTNESS OF BREATH: 0
SPEECH CHANGE: 0
VOMITING: 1
VOMITING: 0
CONSTIPATION: 0
NAUSEA: 1
DIAPHORESIS: 0
CONSTIPATION: 1
MYALGIAS: 0
DIARRHEA: 0
ABDOMINAL PAIN: 1
FEVER: 0

## 2022-05-29 NOTE — CONSULTS
DATE OF SERVICE:  05/27/2022     REFERRING PHYSICIAN:  Mason Mcclain MD     REASON FOR CONSULTATION:  Pelvic pain and a 2.4 cm ovarian cyst.     HISTORY OF PRESENT ILLNESS:  The patient is a 21-year-old nulliparous female,   LMP approximately 3 weeks ago who is sexually active and not on birth control,   who had an appendectomy about 7 days ago on 05/21/2022 uneventfully.  The   patient presented to the emergency room on 05/28/2022 complaining of worsening   abdominal pain, nausea, vomiting, inability to take oral fluids.  Her pain is   primarily upper abdomen, epigastric area and she has no lower abdominal pain.    The patient had a CT scan of her abdomen and pelvis yesterday, which showed   a right ovarian cyst measuring 2.4 cm consistent with involuting corpus   luteum.  OB/GYN was consulted for the ovarian cyst.  The patient is admitted   for IV antibiotics for sepsis.     PAST MEDICAL HISTORY:  Celiac disease.     PAST SURGICAL HISTORY:  Per her recent appendectomy on 05/21/2022.     FAMILY HISTORY:  Noncontributory.     SOCIAL HISTORY:  She has never smoked.  She works at the [employer].  Rare alcohol.  No illicit drug use.     ALLERGIES:  SHE HAS AN ALLERGY TO VICODIN, WHICH CAUSES HIVES.     CURRENT MEDICATIONS:  Flagyl, pain medication and Rocephin.     PHYSICAL EXAMINATION:  VITAL SIGNS:  She is currently afebrile with a temperature of 36.5, pulses.    She is Tachycardic at 103, respiratory rate of 16, blood pressure 124/78.  GENERAL:  Alert and oriented, no acute distress.  ABDOMEN:  Has some healing incisions from her recent appendectomy site.    Multiple tattoos on the skin noted. Lower abdomen soft, nondistended,   nontender, without mass or organosplenomegaly.  PELVIC:  Bimanual exam performed by myself revealed no cervical motion   tenderness.  Normal small midline mobile uterus.  Normal, nontender adnexa.     LABORATORY DATA:  Her white cell count this morning is 17,000 with a left   shift.  Sodium  is 135, potassium 4.1, chloride 102, CO2 16, glucose 157, BUN   and creatinine is 11 and 0.59.  LFTs are normal.     IMPRESSION:  The patient has upper abdominal pain and elevated white cell   count, more likely abdominal pain related to sepsis.  She incidentally has a   2.4 cm involuting right ovarian cyst noted on CT and free fluid in her pelvis,   which is more likely due to her recent surgery for possible recent leaking of   the cyst, but at this point, I think the cyst as consequential to her   hospitalization and is normal physiologic patient can follow up with our   office at [facility] for normal routine gynecologic care when she is   discharged. We will sign off for now on this case, call us if you have any   further questions.  [facility] [phone].        ______________________________  MD SANDEE VERNON/DYLON    DD:  05/29/2022 07:16  DT:  05/29/2022 11:09    Job#:  [id number]

## 2022-05-29 NOTE — CARE PLAN
The patient is Stable - Low risk of patient condition declining or worsening    Shift Goals  Clinical Goals: IV abx, IVF, NPO  Patient Goals: Pain managemen  Family Goals: na    Progress made toward(s) clinical / shift goals:    Problem: Pain - Standard  Goal: Alleviation of pain or a reduction in pain to the patient’s comfort goal  Outcome: Progressing     Problem: Knowledge Deficit - Standard  Goal: Patient and family/care givers will demonstrate understanding of plan of care, disease process/condition, diagnostic tests and medications  Outcome: Progressing     Problem: Hemodynamics  Goal: Patient's hemodynamics, fluid balance and neurologic status will be stable or improve  Outcome: Progressing     Problem: Fluid Volume  Goal: Fluid volume balance will be maintained  Outcome: Progressing       Patient is not progressing towards the following goals:

## 2022-05-29 NOTE — DISCHARGE PLANNING
[facility] TCN chart review completed. Collaborated with YVROSE Torres prior to meeting with the pt. The most current review of medical record, knowledge of pt's PLOF and social support, LACE+ score of 19, 6 clicks scores of 23 mobility were considered.      Pt seen at bedside with family present. Introduced TCN program. Provided education regarding differences in post acute resources, as well as discussed [facility] plan benefits (Meds to Beds, medical uber and [facility] transitional care services). Pt verbalizes understanding. Pt is seen to be up self, ambulating independently in her room and has excellent family support. Anticipating no barriers with regard to access to OP follow ups at discharge, thus no choice obtained and [facility] referral not sent. Given medical POC is still in development, TCN will continue to monitor and collaborate with discharge planning team as needed. Thank you.

## 2022-05-29 NOTE — CARE PLAN
The patient is Watcher - Medium risk of patient condition declining or worsening    Shift Goals  Clinical Goals: Pain control    Progress made toward(s) clinical / shift goals:  Pain medication prn, repositioning, monitor pain, diet restriction    Patient is not progressing towards the following goals:

## 2022-05-29 NOTE — ASSESSMENT & PLAN NOTE
Sec to infection vs. Reactive vs. Stress induced  Follow cultures  Given no clear source of infection, will discontinue antibiotics

## 2022-05-29 NOTE — ASSESSMENT & PLAN NOTE
Noted on CT abdomen.   Gyn consulted, does not believe her current complains are related to ovarian cyst per patient and family.

## 2022-05-29 NOTE — PROGRESS NOTES
[facility] Medicine Daily Progress Note    Date of Service  5/29/2022    Chief Complaint  Khushboo Khan is a 21 y.o. female admitted 5/28/2022 with abdominal pain    Hospital Course  A 21-year-old woman with h/o celiac disease, appendectomy 6 days ago without obvious complications laparoscopically presented with worsening abdominal pain, nausea, vomiting, inability to tolerate oral intake. CT of the abdomen pelvis was done today, showing enhancing right ovarian cyst with irregular margins, nonvisualized appendix, moderate pelvic free fluid collection, hepatomegaly.  Patient met sepsis criteria, started on fluids and antibiotics.  General surgery and OB/GYN consulted. Unclear etiology of pain.     Interval Problem Update  Patient is still having LLQ pain.   Tolerating clear liquid diet.   Dc IVF. Dc iv dilaudid  Discussed with surgery Dr. Rodríguez, no indication of surgical intervention. Likely ruptured ovarian cyst? No need to repeat CT abdomen.   Discussed recommendations again with family and patient.     I have personally seen and examined the patient at bedside. I discussed the plan of care with patient, family and bedside RN.    Consultants/Specialty  general surgery and gynecology    Code Status  Full Code    Disposition  Patient is not medically cleared for discharge.   Anticipate discharge to to home with close outpatient follow-up.  I have placed the appropriate orders for post-discharge needs.    Review of Systems  Review of Systems   Constitutional: Positive for chills and malaise/fatigue. Negative for fever.   HENT: Negative.    Eyes: Negative.    Respiratory: Negative for cough and shortness of breath.    Cardiovascular: Negative for chest pain and leg swelling.   Gastrointestinal: Positive for abdominal pain, nausea and vomiting. Negative for constipation and diarrhea.   Genitourinary: Negative for dysuria.   Musculoskeletal: Negative for myalgias.   Skin: Negative for rash.   Neurological: Positive for  weakness.        Physical Exam  Temp:  [35.9 °C (96.6 °F)-36.7 °C (98 °F)] 35.9 °C (96.6 °F)  Pulse:  [63-78] 78  Resp:  [16-18] 18  BP: ()/(42-59) 98/58  SpO2:  [98 %-100 %] 99 %    Physical Exam  Vitals and nursing note reviewed.   Constitutional:       Appearance: She is ill-appearing.   HENT:      Head: Normocephalic and atraumatic.      Nose: Nose normal.      Mouth/Throat:      Mouth: Mucous membranes are moist.      Pharynx: Oropharynx is clear.   Eyes:      Conjunctiva/sclera: Conjunctivae normal.      Pupils: Pupils are equal, round, and reactive to light.   Cardiovascular:      Rate and Rhythm: Regular rhythm. Tachycardia present.   Pulmonary:      Effort: Pulmonary effort is normal. No respiratory distress.      Breath sounds: No wheezing or rales.   Abdominal:      General: Abdomen is flat. Bowel sounds are normal. There is no distension.      Tenderness: There is abdominal tenderness. There is no guarding.   Musculoskeletal:         General: Normal range of motion.      Cervical back: Normal range of motion.   Skin:     General: Skin is warm.   Neurological:      General: No focal deficit present.      Mental Status: She is alert and oriented to person, place, and time.         Fluids    Intake/Output Summary (Last 24 hours) at 5/29/2022 1303  Last data filed at 5/28/2022 1336  Gross per 24 hour   Intake 0 ml   Output --   Net 0 ml       Laboratory  Recent Labs     05/27/22  1132 05/28/22  0218 05/29/22  0658   WBC 7.7 17.9* 4.9   RBC 5.01 4.63 3.72*   HEMOGLOBIN 15.5 14.4 11.5*   HEMATOCRIT 45.4 40.5 34.2*   MCV 90.6 87.5 91.9   MCH 30.9 31.1 30.9   MCHC 34.1 35.6* 33.6   RDW 39.8 38.8 41.2   PLATELETCT 385 413 254   MPV 10.1 10.2 10.6     Recent Labs     05/27/22  1132 05/28/22  0218 05/29/22  0658   SODIUM 137 135 139   POTASSIUM 4.6 4.1 3.4*   CHLORIDE 102 102 107   CO2 22 16* 21   GLUCOSE 104* 157* 77   BUN 12 11 11   CREATININE 0.64 0.59 0.65   CALCIUM 9.8 9.7 8.5                    Imaging  CT-ABDOMEN-PELVIS WITH   Final Result         1.  Enhancing right ovarian cyst with irregular margins, appearance most compatible with involuting cyst.   2.  Nonvisualization of the appendix, cannot definitively evaluate for and/or exclude appendicitis   3.  Moderate size pelvic free fluid collection.   4.  Hepatomegaly      DX-ABDOMEN COMPLETE WITH AP OR PA CXR   Final Result         1.  No acute cardiopulmonary disease is evident.   2.  Moderate stool in the colon suggests changes of constipation, otherwise nonspecific bowel gas pattern.           Assessment/Plan  * Abdominal pain- (present on admission)  Assessment & Plan  Status post laparoscopic appendectomy, uncomplicated 6 days ago. No definite peritoneal signs on exam  Unclear etiology, ovarian cyst rupture?  CT of the abdomen and pelvis with contrast: Enhancing right ovarian cyst with irregular margins, most compatible with involuting cyst.  Nonvisualized appendix.  Moderate size pelvic free fluid collection  General surgery and gynecology consulted   No immediate plan for surgery  Clear diet, advance as tolerate  Avoid iv pain meds  Bowel protocol, OOB    Ovarian cyst  Assessment & Plan  Noted on CT abdomen.   Gyn consulted, does not believe her current complains are related to ovarian cyst per patient and family.     Leukocytosis  Assessment & Plan  Sec to infection vs. Reactive vs. Stress induced  Follow cultures  Given no clear source of antibiotics, will discontinue antibiotics    Sepsis (HCC)  Assessment & Plan  This is Sepsis Present on admission  SIRS criteria identified on my evaluation include: Tachycardia, with heart rate greater than 90 BPM and Leukocytosis, with WBC greater than 12,000  Source is intra-abdominal infection vs. Dehydration vs. Stress   Sepsis protocol initiated  Fluid resuscitation ordered per protocol  Crystalloid Fluid Administration: Fluid resuscitation ordered per standard protocol - 30 mL/kg per current or ideal  body weight  IV antibiotics as appropriate for source of sepsis  Reassessment: I have reassessed the patient's hemodynamic status  Follow up culture       VTE prophylaxis: enoxaparin ppx    I have performed a physical exam and reviewed and updated ROS and Plan today (5/29/2022). In review of yesterday's note (5/28/2022), there are no changes except as documented above.

## 2022-05-29 NOTE — PROGRESS NOTES
"    DATE: 5/29/2022    Hospital Day 2 Abdominal pain after laparoscopic appendectomy on 5/21/22.    INTERVAL EVENTS:  Feeling better, nausea/vomiting resolved, abdominal pain evolving and now mainly left lower quadrant, tolerating clears, no BM or flatus.  WBC and lactic normalized.    REVIEW OF SYSTEMS:  Review of Systems   Constitutional: Negative for chills, diaphoresis, fever and malaise/fatigue.   Respiratory: Negative for shortness of breath.    Cardiovascular: Negative for chest pain.   Gastrointestinal: Positive for abdominal pain (lower left quadrant) and constipation (BM prior to arrival, no flatus). Negative for nausea and vomiting.   Genitourinary: Negative for dysuria (voiding).   Skin: Negative for rash.   Neurological: Negative for speech change.     PHYSICAL EXAMINATION:    Vital Signs: BP (!) 98/58   Pulse 78   Temp 35.9 °C (96.6 °F) (Temporal)   Resp 18   Ht 1.727 m (5' 8\")   Wt 56.7 kg (125 lb)   SpO2 99%   Physical Exam  Vitals and nursing note reviewed. Exam conducted with a chaperone present (family at bedside).   Constitutional:       General: She is awake. She is not in acute distress.     Appearance: She is well-developed. She is not ill-appearing.   Pulmonary:      Effort: Pulmonary effort is normal. No respiratory distress.   Abdominal:      General: There is no distension.      Tenderness: There is abdominal tenderness. There is guarding.      Comments: Port sites c/d/i with skin glue in place   Musculoskeletal:      Comments: Moves all extremities   Skin:     General: Skin is warm and dry.   Neurological:      Mental Status: She is alert and oriented to person, place, and time.   Psychiatric:         Mood and Affect: Mood normal.         Behavior: Behavior normal. Behavior is cooperative.     LABORATORY VALUES:   Recent Labs     05/27/22  1132 05/28/22  0218 05/29/22  0658   WBC 7.7 17.9* 4.9   RBC 5.01 4.63 3.72*   HEMOGLOBIN 15.5 14.4 11.5*   HEMATOCRIT 45.4 40.5 34.2*   MCV 90.6 " 87.5 91.9   MCH 30.9 31.1 30.9   MCHC 34.1 35.6* 33.6   RDW 39.8 38.8 41.2   PLATELETCT 385 413 254   MPV 10.1 10.2 10.6     Recent Labs     05/27/22  1132 05/28/22  0218 05/29/22  0658   SODIUM 137 135 139   POTASSIUM 4.6 4.1 3.4*   CHLORIDE 102 102 107   CO2 22 16* 21   GLUCOSE 104* 157* 77   BUN 12 11 11   CREATININE 0.64 0.59 0.65   CALCIUM 9.8 9.7 8.5     Recent Labs     05/27/22  1132 05/28/22  0218 05/29/22  0658   ASTSGOT 19 22 14   ALTSGPT 17 16 10   TBILIRUBIN 0.3 0.4 0.2   ALKPHOSPHAT 67 69 51   GLOBULIN 3.2 2.9 2.2       ASSESSMENT AND PLAN:   - Continue clear liquids for now  - Advance bowel regimen  - Mobilize  - No plans for surgery at this time       ____________________________________     RADHA Mc DD: 5/29/2022  12:40 PM

## 2022-05-30 PROBLEM — K59.00 CONSTIPATION: Status: ACTIVE | Noted: 2022-05-30

## 2022-05-30 PROBLEM — R10.32 LEFT LOWER QUADRANT ABDOMINAL PAIN: Status: ACTIVE | Noted: 2022-05-28

## 2022-05-30 PROBLEM — Z90.49 S/P APPENDECTOMY: Status: ACTIVE | Noted: 2022-05-30

## 2022-05-30 LAB
ANION GAP SERPL CALC-SCNC: 9 MMOL/L (ref 7–16)
BACTERIA UR CULT: NORMAL
BUN SERPL-MCNC: 6 MG/DL (ref 8–22)
CALCIUM SERPL-MCNC: 9 MG/DL (ref 8.5–10.5)
CHLORIDE SERPL-SCNC: 105 MMOL/L (ref 96–112)
CO2 SERPL-SCNC: 25 MMOL/L (ref 20–33)
CREAT SERPL-MCNC: 0.61 MG/DL (ref 0.5–1.4)
ERYTHROCYTE [DISTWIDTH] IN BLOOD BY AUTOMATED COUNT: 39.9 FL (ref 35.9–50)
GFR SERPLBLD CREATININE-BSD FMLA CKD-EPI: 128 ML/MIN/1.73 M 2
GLUCOSE SERPL-MCNC: 85 MG/DL (ref 65–99)
HCT VFR BLD AUTO: 34.6 % (ref 37–47)
HGB BLD-MCNC: 11.8 G/DL (ref 12–16)
MCH RBC QN AUTO: 31.1 PG (ref 27–33)
MCHC RBC AUTO-ENTMCNC: 34.1 G/DL (ref 33.6–35)
MCV RBC AUTO: 91.1 FL (ref 81.4–97.8)
PLATELET # BLD AUTO: 269 K/UL (ref 164–446)
PMV BLD AUTO: 10.3 FL (ref 9–12.9)
POTASSIUM SERPL-SCNC: 3.8 MMOL/L (ref 3.6–5.5)
RBC # BLD AUTO: 3.8 M/UL (ref 4.2–5.4)
SIGNIFICANT IND 70042: NORMAL
SITE SITE: NORMAL
SODIUM SERPL-SCNC: 139 MMOL/L (ref 135–145)
SOURCE SOURCE: NORMAL
WBC # BLD AUTO: 4.6 K/UL (ref 4.8–10.8)

## 2022-05-30 PROCEDURE — 36415 COLL VENOUS BLD VENIPUNCTURE: CPT

## 2022-05-30 PROCEDURE — 85027 COMPLETE CBC AUTOMATED: CPT

## 2022-05-30 PROCEDURE — 700102 HCHG RX REV CODE 250 W/ 637 OVERRIDE(OP): Performed by: STUDENT IN AN ORGANIZED HEALTH CARE EDUCATION/TRAINING PROGRAM

## 2022-05-30 PROCEDURE — 80048 BASIC METABOLIC PNL TOTAL CA: CPT

## 2022-05-30 PROCEDURE — 700102 HCHG RX REV CODE 250 W/ 637 OVERRIDE(OP): Performed by: INTERNAL MEDICINE

## 2022-05-30 PROCEDURE — 99231 SBSQ HOSP IP/OBS SF/LOW 25: CPT | Mod: 24 | Performed by: NURSE PRACTITIONER

## 2022-05-30 PROCEDURE — A9270 NON-COVERED ITEM OR SERVICE: HCPCS | Performed by: STUDENT IN AN ORGANIZED HEALTH CARE EDUCATION/TRAINING PROGRAM

## 2022-05-30 PROCEDURE — 770006 HCHG ROOM/CARE - MED/SURG/GYN SEMI*

## 2022-05-30 PROCEDURE — 99233 SBSQ HOSP IP/OBS HIGH 50: CPT | Performed by: STUDENT IN AN ORGANIZED HEALTH CARE EDUCATION/TRAINING PROGRAM

## 2022-05-30 PROCEDURE — A9270 NON-COVERED ITEM OR SERVICE: HCPCS | Performed by: INTERNAL MEDICINE

## 2022-05-30 PROCEDURE — 700111 HCHG RX REV CODE 636 W/ 250 OVERRIDE (IP): Performed by: INTERNAL MEDICINE

## 2022-05-30 RX ORDER — METRONIDAZOLE 500 MG/1
500 TABLET ORAL EVERY 8 HOURS
Status: CANCELLED | OUTPATIENT
Start: 2022-05-30

## 2022-05-30 RX ADMIN — POLYETHYLENE GLYCOL 3350 1 PACKET: 17 POWDER, FOR SOLUTION ORAL at 15:49

## 2022-05-30 RX ADMIN — SENNOSIDES AND DOCUSATE SODIUM 2 TABLET: 50; 8.6 TABLET ORAL at 17:20

## 2022-05-30 RX ADMIN — BISACODYL 10 MG: 10 SUPPOSITORY RECTAL at 14:33

## 2022-05-30 RX ADMIN — OXYCODONE HYDROCHLORIDE 10 MG: 10 TABLET ORAL at 21:34

## 2022-05-30 RX ADMIN — MAGNESIUM CITRATE 296 ML: 1.75 LIQUID ORAL at 11:35

## 2022-05-30 RX ADMIN — OXYCODONE HYDROCHLORIDE 10 MG: 10 TABLET ORAL at 18:13

## 2022-05-30 RX ADMIN — FAMOTIDINE 20 MG: 10 INJECTION, SOLUTION INTRAVENOUS at 04:42

## 2022-05-30 RX ADMIN — ENOXAPARIN SODIUM 40 MG: 40 INJECTION SUBCUTANEOUS at 17:19

## 2022-05-30 RX ADMIN — FAMOTIDINE 20 MG: 20 TABLET, FILM COATED ORAL at 17:20

## 2022-05-30 RX ADMIN — MAGNESIUM HYDROXIDE 30 ML: 400 SUSPENSION ORAL at 15:48

## 2022-05-30 RX ADMIN — OXYCODONE HYDROCHLORIDE 10 MG: 10 TABLET ORAL at 11:41

## 2022-05-30 RX ADMIN — CETIRIZINE HYDROCHLORIDE 10 MG: 10 TABLET, FILM COATED ORAL at 04:42

## 2022-05-30 RX ADMIN — SENNOSIDES AND DOCUSATE SODIUM 2 TABLET: 50; 8.6 TABLET ORAL at 04:42

## 2022-05-30 ASSESSMENT — PAIN SCALES - PAIN ASSESSMENT IN ADVANCED DEMENTIA (PAINAD)
CONSOLABILITY: NO NEED TO CONSOLE
TOTALSCORE: 0
FACIALEXPRESSION: SMILING OR INEXPRESSIVE
BODYLANGUAGE: RELAXED
BREATHING: NORMAL

## 2022-05-30 ASSESSMENT — ENCOUNTER SYMPTOMS
VOMITING: 0
MYALGIAS: 0
NAUSEA: 0
CONSTIPATION: 1
CHILLS: 1
SHORTNESS OF BREATH: 0
FEVER: 0
WEAKNESS: 1
CONSTIPATION: 0
ABDOMINAL PAIN: 1
EYES NEGATIVE: 1
HEARTBURN: 0
FLANK PAIN: 0
COUGH: 0
DIARRHEA: 0
CHILLS: 0

## 2022-05-30 ASSESSMENT — PAIN SCALES - WONG BAKER: WONGBAKER_NUMERICALRESPONSE: DOESN'T HURT AT ALL

## 2022-05-30 NOTE — PROGRESS NOTES
"      DATE: 5/30/2022    Hospital day 3 for abdominal pain after laparoscopic appendectomy on 5/21/2022.    INTERVAL EVENTS:  Tolerating oral diet x 48 hrs, ambulating halls, passing flatus, still constipated, was feeling better, recently had mag citrate and now mildly nauseated  LLQ pain is major complaint, reports pain has been present there an intermittent x 10 days  Denies acid reflux symptoms, has been on pepcid this hospital course  Labs reassuring  Discussed symptoms and hospital course at length with pt and mother    REVIEW OF SYSTEMS:  Review of Systems   Constitutional: Negative for chills, fever and malaise/fatigue.   Respiratory: Negative for cough and shortness of breath.    Cardiovascular: Negative for chest pain.   Gastrointestinal: Positive for abdominal pain (LLQ) and constipation. Negative for diarrhea, heartburn, nausea and vomiting.   Genitourinary: Negative for dysuria (voiding), flank pain, frequency, hematuria and urgency.       PHYSICAL EXAMINATION:  Vital Signs: BP (!) 98/69   Pulse 82   Temp 36.7 °C (98.1 °F) (Temporal)   Resp 14   Ht 1.727 m (5' 8\")   Wt 56.7 kg (125 lb)   SpO2 99%   Physical Exam  Vitals and nursing note reviewed. Exam conducted with a chaperone present (family at bedside).   Constitutional:       General: She is awake. She is not in acute distress.     Appearance: She is well-developed. She is not ill-appearing, toxic-appearing or diaphoretic.   Pulmonary:      Effort: Pulmonary effort is normal. No respiratory distress.   Abdominal:      Comments: Port sites not visualized   Musculoskeletal:      Comments: Moves all extremities   Neurological:      Mental Status: She is alert and oriented to person, place, and time.   Psychiatric:         Mood and Affect: Mood normal.         Behavior: Behavior normal. Behavior is cooperative.         LABORATORY VALUES:   Recent Labs     05/28/22  0218 05/29/22  0658 05/30/22  0623   WBC 17.9* 4.9 4.6*   RBC 4.63 3.72* 3.80* "   HEMOGLOBIN 14.4 11.5* 11.8*   HEMATOCRIT 40.5 34.2* 34.6*   MCV 87.5 91.9 91.1   MCH 31.1 30.9 31.1   MCHC 35.6* 33.6 34.1   RDW 38.8 41.2 39.9   PLATELETCT 413 254 269   MPV 10.2 10.6 10.3     Recent Labs     05/28/22  0218 05/29/22  0658 05/30/22  0623   SODIUM 135 139 139   POTASSIUM 4.1 3.4* 3.8   CHLORIDE 102 107 105   CO2 16* 21 25   GLUCOSE 157* 77 85   BUN 11 11 6*   CREATININE 0.59 0.65 0.61   CALCIUM 9.7 8.5 9.0     Recent Labs     05/28/22 0218 05/29/22  0658   ASTSGOT 22 14   ALTSGPT 16 10   TBILIRUBIN 0.4 0.2   ALKPHOSPHAT 69 51   GLOBULIN 2.9 2.2            IMAGING:   CT-ABDOMEN-PELVIS WITH   Final Result         1.  Enhancing right ovarian cyst with irregular margins, appearance most compatible with involuting cyst.   2.  Nonvisualization of the appendix, cannot definitively evaluate for and/or exclude appendicitis   3.  Moderate size pelvic free fluid collection.   4.  Hepatomegaly      DX-ABDOMEN COMPLETE WITH AP OR PA CXR   Final Result         1.  No acute cardiopulmonary disease is evident.   2.  Moderate stool in the colon suggests changes of constipation, otherwise nonspecific bowel gas pattern.          Labs reviewed, Radiology images reviewed and Medications reviewed  Paez catheter: No Paez      DVT Prophylaxis: Enoxaparin (Lovenox)  DVT prophylaxis - mechanical: SCDs  Ulcer prophylaxis: Yes    Assessed for rehab: Patient returned to prior level of function, rehabilitation not indicated at this time      ASSESSMENT AND PLAN:   * Left lower quadrant abdominal pain- (present on admission)  Assessment & Plan  Reporting LLQ for about 10 days now.  Family history of diverticulitis.  Recommend follow up with gastroenterology in 6-8 weeks as an outpatient.    S/P appendectomy- (present on admission)  Assessment & Plan  5/21 Uncomplicated laparoscopic appendectomy with Dr. Trejo.    Constipation- (present on admission)  Assessment & Plan  5/28 Abdominal film consistent with constipation.  Bowel  protocol.  5/30 Suppository, if ineffective milk and molasses enema.    Ovarian cyst- (present on admission)  Assessment & Plan  5/27 CT abd/pelvis with right ovarian cystic lesion measures 2.6 cm in diameter, unchanged when compared with the prior study. No pelvic abscess.  5/27 Transvaginal and transabdominal US with right ovarian cyst with low level, lacelike internal echoes, likely a hemorrhagic cyst. There is small volume free fluid in the pelvis. Ultrasound in 6-12 weeks could be performed to evaluate for resolution/decrease size of the cyst.  5/28 CT with right ovarian cyst with irregular margins, appearance most compatible with involuting cyst.  Recommend follow up with PCP or OBGYN in 6-12 weeks.    - Constipation addressed  - Surgery will sign off at this time, disposition per medicine service  - Would recommend outpatient follow up with OBGYN and GI in 6-12 weeks    Discussed patient condition with Family, RN, Patient and general surgery and internal medicine. Dr. Falk and Dr. Rodríguez

## 2022-05-30 NOTE — ASSESSMENT & PLAN NOTE
5/28 Abdominal film consistent with constipation.  Bowel protocol.  5/30 Suppository, if ineffective milk and molasses enema.

## 2022-05-30 NOTE — PROGRESS NOTES
Hospital Medicine Daily Progress Note    Date of Service  5/30/2022    Chief Complaint  Khushboo Khan is a 21 y.o. female admitted 5/28/2022 with abdominal pain    Hospital Course  A 21-year-old woman with h/o celiac disease, appendectomy 6 days ago without obvious complications laparoscopically presented with worsening abdominal pain, nausea, vomiting, inability to tolerate oral intake. CT of the abdomen pelvis was done today, showing enhancing right ovarian cyst with irregular margins, nonvisualized appendix, moderate pelvic free fluid collection, hepatomegaly.  Patient met sepsis criteria, started on fluids and antibiotics.  General surgery and OB/GYN consulted. Unclear etiology of pain.     Interval Problem Update  Patient is still having LLQ pain. Reports pain improving in the morning, however recurred 8/10 later.   Tolerating Full liquid diet. Advance to soft diet.   Dc IVF. Dc iv dilaudid  Surgery following. ?constipation.   Mag citrate and suppository ordered   Discussed recommendations again with family and patient.     I have personally seen and examined the patient at bedside. I discussed the plan of care with patient, family and bedside RN.    Consultants/Specialty  general surgery and gynecology    Code Status  Full Code    Disposition  Patient is not medically cleared for discharge.   Anticipate discharge to to home with close outpatient follow-up.  I have placed the appropriate orders for post-discharge needs.    Review of Systems  Review of Systems   Constitutional: Positive for chills and malaise/fatigue. Negative for fever.   HENT: Negative.    Eyes: Negative.    Respiratory: Negative for cough and shortness of breath.    Cardiovascular: Negative for chest pain and leg swelling.   Gastrointestinal: Positive for abdominal pain. Negative for constipation and diarrhea.   Genitourinary: Negative for dysuria.   Musculoskeletal: Negative for myalgias.   Skin: Negative for rash.   Neurological: Positive  for weakness.        Physical Exam  Temp:  [36.2 °C (97.2 °F)-36.7 °C (98.1 °F)] 36.7 °C (98.1 °F)  Pulse:  [82-86] 82  Resp:  [14-18] 14  BP: ()/(52-69) 98/69  SpO2:  [99 %] 99 %    Physical Exam  Vitals and nursing note reviewed.   HENT:      Head: Normocephalic and atraumatic.      Nose: Nose normal.      Mouth/Throat:      Mouth: Mucous membranes are moist.      Pharynx: Oropharynx is clear.   Eyes:      Conjunctiva/sclera: Conjunctivae normal.      Pupils: Pupils are equal, round, and reactive to light.   Cardiovascular:      Rate and Rhythm: Regular rhythm. Tachycardia present.   Pulmonary:      Effort: Pulmonary effort is normal. No respiratory distress.      Breath sounds: No wheezing or rales.   Abdominal:      General: Abdomen is flat. Bowel sounds are normal. There is no distension.      Tenderness: There is abdominal tenderness (LLQ). There is no guarding.      Comments: Surgical site noted, clean, dry   Musculoskeletal:         General: Normal range of motion.      Cervical back: Normal range of motion.   Skin:     General: Skin is warm.   Neurological:      General: No focal deficit present.      Mental Status: She is alert and oriented to person, place, and time.         Fluids    Intake/Output Summary (Last 24 hours) at 5/30/2022 1503  Last data filed at 5/30/2022 1000  Gross per 24 hour   Intake 360 ml   Output --   Net 360 ml       Laboratory  Recent Labs     05/28/22 0218 05/29/22  0658 05/30/22  0623   WBC 17.9* 4.9 4.6*   RBC 4.63 3.72* 3.80*   HEMOGLOBIN 14.4 11.5* 11.8*   HEMATOCRIT 40.5 34.2* 34.6*   MCV 87.5 91.9 91.1   MCH 31.1 30.9 31.1   MCHC 35.6* 33.6 34.1   RDW 38.8 41.2 39.9   PLATELETCT 413 254 269   MPV 10.2 10.6 10.3     Recent Labs     05/28/22  0218 05/29/22  0658 05/30/22  0623   SODIUM 135 139 139   POTASSIUM 4.1 3.4* 3.8   CHLORIDE 102 107 105   CO2 16* 21 25   GLUCOSE 157* 77 85   BUN 11 11 6*   CREATININE 0.59 0.65 0.61   CALCIUM 9.7 8.5 9.0                    Imaging  CT-ABDOMEN-PELVIS WITH   Final Result         1.  Enhancing right ovarian cyst with irregular margins, appearance most compatible with involuting cyst.   2.  Nonvisualization of the appendix, cannot definitively evaluate for and/or exclude appendicitis   3.  Moderate size pelvic free fluid collection.   4.  Hepatomegaly      DX-ABDOMEN COMPLETE WITH AP OR PA CXR   Final Result         1.  No acute cardiopulmonary disease is evident.   2.  Moderate stool in the colon suggests changes of constipation, otherwise nonspecific bowel gas pattern.           Assessment/Plan  * Left lower quadrant abdominal pain- (present on admission)  Assessment & Plan  Status post laparoscopic appendectomy, uncomplicated 6 days ago. No definite peritoneal signs on exam  Unclear etiology, ovarian cyst rupture?   CT of the abdomen and pelvis with contrast: Enhancing right ovarian cyst with irregular margins, most compatible with involuting cyst.  Nonvisualized appendix.  Moderate size pelvic free fluid collection  General surgery and gynecology consulted   No immediate plan for surgery  Clear diet, advance as tolerate  Avoid iv pain meds  Bowel protocol, OOB    Ovarian cyst- (present on admission)  Assessment & Plan  Noted on CT abdomen.   Gyn consulted, does not believe her current complains are related to ovarian cyst per patient and family.     Leukocytosis  Assessment & Plan  Sec to infection vs. Reactive vs. Stress induced  Follow cultures  Given no clear source of infection, will discontinue antibiotics    Sepsis (HCC)  Assessment & Plan  This is Sepsis Present on admission  SIRS criteria identified on my evaluation include: Tachycardia, with heart rate greater than 90 BPM and Leukocytosis, with WBC greater than 12,000  Source is intra-abdominal infection vs. Dehydration vs. Stress   Sepsis protocol initiated  Fluid resuscitation ordered per protocol  Crystalloid Fluid Administration: Fluid resuscitation ordered per  standard protocol - 30 mL/kg per current or ideal body weight  IV antibiotics as appropriate for source of sepsis  Reassessment: I have reassessed the patient's hemodynamic status  Follow up culture       VTE prophylaxis: enoxaparin ppx    I have performed a physical exam and reviewed and updated ROS and Plan today (5/30/2022). In review of yesterday's note (5/29/2022), there are no changes except as documented above.

## 2022-05-30 NOTE — ASSESSMENT & PLAN NOTE
Reporting LLQ for about 10 days now.  Family history of diverticulitis.  Recommend follow up with gastroenterology in 6-8 weeks as an outpatient.

## 2022-05-30 NOTE — CARE PLAN
The patient is Stable - Low risk of patient condition declining or worsening    Shift Goals  Clinical Goals: pain management  Patient Goals: comfort; rest  Family Goals: na    Progress made toward(s) clinical / shift goals:   Problem: Pain - Standard  Goal: Alleviation of pain or a reduction in pain to the patient’s comfort goal  Outcome: Progressing     Problem: Knowledge Deficit - Standard  Goal: Patient and family/care givers will demonstrate understanding of plan of care, disease process/condition, diagnostic tests and medications  Outcome: Progressing     Problem: Hemodynamics  Goal: Patient's hemodynamics, fluid balance and neurologic status will be stable or improve  Outcome: Progressing     Problem: Fluid Volume  Goal: Fluid volume balance will be maintained  Outcome: Progressing       Patient is not progressing towards the following goals:

## 2022-05-30 NOTE — DISCHARGE PLANNING
"TCN following. [facility] chart review completed. Mbr is S/P  laparoscopic appendectomy on 5/21/22..  Patient currently not in the room.  Mbr appears to not have any functional concerns at this time.   Mbr currently not on IV antibiotics after receiving IV Rocephin and IV Flagyl.  There are no plans for surgery at this time according to General Surgery following this mbr.Hospitalist noted on 5/29/22 \" Patient is not medically cleared for discharge. \"    Previously completed:  - Choice forms: NONE   - [facility] referral ( not sent)* n/a  "

## 2022-05-30 NOTE — ASSESSMENT & PLAN NOTE
5/27 CT abd/pelvis with right ovarian cystic lesion measures 2.6 cm in diameter, unchanged when compared with the prior study. No pelvic abscess.  5/27 Transvaginal and transabdominal US with right ovarian cyst with low level, lacelike internal echoes, likely a hemorrhagic cyst. There is small volume free fluid in the pelvis. Ultrasound in 6-12 weeks could be performed to evaluate for resolution/decrease size of the cyst.  5/28 CT with right ovarian cyst with irregular margins, appearance most compatible with involuting cyst.  Recommend follow up with PCP or OBGYN in 6-12 weeks.

## 2022-05-30 NOTE — CARE PLAN
The patient is Stable - Low risk of patient condition declining or worsening    Shift Goals  Clinical Goals: Pain control  Patient Goals: Pain managemen  Family Goals: na    Progress made toward(s) clinical / shift goals:  Pain medication prn, monitor pain, heat packs    Patient is not progressing towards the following goals:

## 2022-05-31 VITALS
OXYGEN SATURATION: 90 % | HEIGHT: 68 IN | SYSTOLIC BLOOD PRESSURE: 102 MMHG | DIASTOLIC BLOOD PRESSURE: 67 MMHG | HEART RATE: 90 BPM | RESPIRATION RATE: 18 BRPM | BODY MASS INDEX: 18.94 KG/M2 | WEIGHT: 125 LBS | TEMPERATURE: 97.3 F

## 2022-05-31 LAB
ANION GAP SERPL CALC-SCNC: 12 MMOL/L (ref 7–16)
APPEARANCE UR: CLEAR
BILIRUB UR QL STRIP.AUTO: NEGATIVE
BUN SERPL-MCNC: 10 MG/DL (ref 8–22)
CALCIUM SERPL-MCNC: 9.3 MG/DL (ref 8.5–10.5)
CHLORIDE SERPL-SCNC: 101 MMOL/L (ref 96–112)
CO2 SERPL-SCNC: 25 MMOL/L (ref 20–33)
COLOR UR: YELLOW
CREAT SERPL-MCNC: 0.68 MG/DL (ref 0.5–1.4)
ERYTHROCYTE [DISTWIDTH] IN BLOOD BY AUTOMATED COUNT: 39 FL (ref 35.9–50)
GFR SERPLBLD CREATININE-BSD FMLA CKD-EPI: 125 ML/MIN/1.73 M 2
GLUCOSE SERPL-MCNC: 96 MG/DL (ref 65–99)
GLUCOSE UR STRIP.AUTO-MCNC: NEGATIVE MG/DL
HCT VFR BLD AUTO: 38.9 % (ref 37–47)
HGB BLD-MCNC: 13.6 G/DL (ref 12–16)
KETONES UR STRIP.AUTO-MCNC: NEGATIVE MG/DL
LEUKOCYTE ESTERASE UR QL STRIP.AUTO: NEGATIVE
MAGNESIUM SERPL-MCNC: 2.3 MG/DL (ref 1.5–2.5)
MCH RBC QN AUTO: 31 PG (ref 27–33)
MCHC RBC AUTO-ENTMCNC: 35 G/DL (ref 33.6–35)
MCV RBC AUTO: 88.6 FL (ref 81.4–97.8)
MICRO URNS: NORMAL
NITRITE UR QL STRIP.AUTO: NEGATIVE
PH UR STRIP.AUTO: 8 [PH] (ref 5–8)
PHOSPHATE SERPL-MCNC: 3.6 MG/DL (ref 2.5–4.5)
PLATELET # BLD AUTO: 315 K/UL (ref 164–446)
PMV BLD AUTO: 10.1 FL (ref 9–12.9)
POTASSIUM SERPL-SCNC: 4 MMOL/L (ref 3.6–5.5)
PROT UR QL STRIP: NEGATIVE MG/DL
RBC # BLD AUTO: 4.39 M/UL (ref 4.2–5.4)
RBC UR QL AUTO: NEGATIVE
SODIUM SERPL-SCNC: 138 MMOL/L (ref 135–145)
SP GR UR STRIP.AUTO: 1
UROBILINOGEN UR STRIP.AUTO-MCNC: 0.2 MG/DL
WBC # BLD AUTO: 4.9 K/UL (ref 4.8–10.8)

## 2022-05-31 PROCEDURE — 85027 COMPLETE CBC AUTOMATED: CPT

## 2022-05-31 PROCEDURE — 80048 BASIC METABOLIC PNL TOTAL CA: CPT

## 2022-05-31 PROCEDURE — A9270 NON-COVERED ITEM OR SERVICE: HCPCS | Performed by: STUDENT IN AN ORGANIZED HEALTH CARE EDUCATION/TRAINING PROGRAM

## 2022-05-31 PROCEDURE — 81003 URINALYSIS AUTO W/O SCOPE: CPT

## 2022-05-31 PROCEDURE — 700102 HCHG RX REV CODE 250 W/ 637 OVERRIDE(OP): Performed by: STUDENT IN AN ORGANIZED HEALTH CARE EDUCATION/TRAINING PROGRAM

## 2022-05-31 PROCEDURE — 99239 HOSP IP/OBS DSCHRG MGMT >30: CPT | Performed by: GENERAL PRACTICE

## 2022-05-31 PROCEDURE — 84100 ASSAY OF PHOSPHORUS: CPT

## 2022-05-31 PROCEDURE — A9270 NON-COVERED ITEM OR SERVICE: HCPCS | Performed by: INTERNAL MEDICINE

## 2022-05-31 PROCEDURE — 700102 HCHG RX REV CODE 250 W/ 637 OVERRIDE(OP): Performed by: INTERNAL MEDICINE

## 2022-05-31 PROCEDURE — 83735 ASSAY OF MAGNESIUM: CPT

## 2022-05-31 PROCEDURE — 87086 URINE CULTURE/COLONY COUNT: CPT

## 2022-05-31 PROCEDURE — 36415 COLL VENOUS BLD VENIPUNCTURE: CPT

## 2022-05-31 RX ORDER — OXYCODONE HYDROCHLORIDE 5 MG/1
5 TABLET ORAL EVERY 8 HOURS PRN
Qty: 9 TABLET | Refills: 0 | Status: SHIPPED | OUTPATIENT
Start: 2022-05-31 | End: 2022-06-03

## 2022-05-31 RX ADMIN — SENNOSIDES AND DOCUSATE SODIUM 2 TABLET: 50; 8.6 TABLET ORAL at 17:42

## 2022-05-31 RX ADMIN — SENNOSIDES AND DOCUSATE SODIUM 2 TABLET: 50; 8.6 TABLET ORAL at 06:03

## 2022-05-31 RX ADMIN — OXYCODONE 5 MG: 5 TABLET ORAL at 08:43

## 2022-05-31 RX ADMIN — OXYCODONE 5 MG: 5 TABLET ORAL at 14:17

## 2022-05-31 RX ADMIN — FAMOTIDINE 20 MG: 20 TABLET, FILM COATED ORAL at 06:02

## 2022-05-31 RX ADMIN — POLYETHYLENE GLYCOL 3350 1 PACKET: 17 POWDER, FOR SOLUTION ORAL at 06:03

## 2022-05-31 RX ADMIN — CETIRIZINE HYDROCHLORIDE 10 MG: 10 TABLET, FILM COATED ORAL at 06:02

## 2022-05-31 RX ADMIN — FAMOTIDINE 20 MG: 20 TABLET, FILM COATED ORAL at 17:42

## 2022-05-31 ASSESSMENT — PAIN DESCRIPTION - PAIN TYPE
TYPE: ACUTE PAIN

## 2022-05-31 NOTE — CARE PLAN
The patient is Stable - Low risk of patient condition declining or worsening    Shift Goals  Clinical Goals: Pain control  Patient Goals: comfort; rest  Family Goals: na    Progress made toward(s) clinical / shift goals:  Medication to stimulate BM to relieve abd pain, repositioning, pain medication prn, monitor pain    Patient is not progressing towards the following goals:

## 2022-05-31 NOTE — DISCHARGE INSTRUCTIONS
He had been provided with information for 2 gastroenterology groups in [place], [facility] and [facility], please call both offices to determine which is covered by your insurance.  Make an appointment for outpatient follow-up for possible colonoscopy.    Simultaneously please make an appointment with the GYN, and have your routine Pap smear etc. performed. They will continue to follow your 2.4 cm right ovarian cyst.    If you are going to continue taking pain medication, please also take stool softeners, this can be purchased over-the-counter to help prevent constipation that can happen from taking consistent pain medications.    Please ensure they follow-up with your primary care physician for routine physical.    Please take care and be well!Discharge Instructions    Discharged to home by car with relative. Discharged via wheelchair, hospital escort: Refused.  Special equipment needed: Not Applicable    Be sure to schedule a follow-up appointment with your primary care doctor or any specialists as instructed.     Discharge Plan:   Diet Plan: Discussed  Activity Level: Discussed  Confirmed Follow up Appointment: Patient to Call and Schedule Appointment  Confirmed Symptoms Management: Discussed  Medication Reconciliation Updated: Yes    I understand that a diet low in cholesterol, fat, and sodium is recommended for good health. Unless I have been given specific instructions below for another diet, I accept this instruction as my diet prescription.   Other diet: soft    Special Instructions: None    Is patient discharged on Warfarin / Coumadin?   No     Depression / Suicide Risk    As you are discharged from this [facility], [place], it is important to learn how to keep safe from harming yourself.    Recognize the warning signs:  Abrupt changes in personality, positive or negative- including increase in energy   Giving away possessions  Change in eating patterns- significant weight  changes-  positive or negative  Change in sleeping patterns- unable to sleep or sleeping all the time   Unwillingness or inability to communicate  Depression  Unusual sadness, discouragement and loneliness  Talk of wanting to die  Neglect of personal appearance   Rebelliousness- reckless behavior  Withdrawal from people/activities they love  Confusion- inability to concentrate     If you or a loved one observes any of these behaviors or has concerns about self-harm, here's what you can do:  Talk about it- your feelings and reasons for harming yourself  Remove any means that you might use to hurt yourself (examples: pills, rope, extension cords, firearm)  Get professional help from the community (Mental Health, Substance Abuse, psychological counseling)  Do not be alone:Call your Safe Contact- someone whom you trust who will be there for you.  Call your local CRISIS HOTLINE 158-8846 or 836-498-6560  Call your local Children's Mobile Crisis Response Team [place] (564) 944-8517 or www.Aviacomm  Call the toll free National Suicide Prevention Hotlines   National Suicide Prevention Lifeline 273-901-ENLH (8186)  National Hope Line Network 800-SUICIDE (685-7421)

## 2022-05-31 NOTE — PROGRESS NOTES
Pt is A&Ox4, c/o pain 6/10 in abdomen, pt medicated with PRN Oxy per MAR. Family at bedside. Pt had a BM this am. No further needs at this time.

## 2022-05-31 NOTE — CARE PLAN
"The patient is Stable - Low risk of patient condition declining or worsening    Shift Goals  Clinical Goals: pain managment  Patient Goals: \"to go home\"  Family Goals: na    Progress made toward(s) clinical / shift goals:    Problem: Pain - Standard  Goal: Alleviation of pain or a reduction in pain to the patient’s comfort goal  Outcome: Progressing     Problem: Knowledge Deficit - Standard  Goal: Patient and family/care givers will demonstrate understanding of plan of care, disease process/condition, diagnostic tests and medications  Outcome: Progressing     Problem: Hemodynamics  Goal: Patient's hemodynamics, fluid balance and neurologic status will be stable or improve  Outcome: Progressing     Problem: Fluid Volume  Goal: Fluid volume balance will be maintained  Outcome: Progressing       Patient is not progressing towards the following goals:      "

## 2022-05-31 NOTE — DISCHARGE SUMMARY
Discharge Summary    CHIEF COMPLAINT ON ADMISSION  Chief Complaint   Patient presents with   • Abdominal Pain     Pt reports that she had her appendix removed here 6 days ago. She reports abdominal pain that is increasing today. Pt was discharged from this facility yesterday at 1800. Pt also endorses nausea and vomiting but denies diarrhea or fevers. She continues to have n/v despite prescription Zofran which she last took at 2000 and percocet at 2200 with no relief.       Reason for Admission  Vomiting     Admission Date  5/28/2022    CODE STATUS  Full Code    HPI & HOSPITAL COURSE  This is a 21 year old female with PMHx of celiac disease with recent appendectomy on  05/21 1) with what ever biologic who was admitted on 05/28/2022 with intractable nausea, vomiting and abdominal pain.    CT of the abdomen, pelvis x 2 and transvaginal US noted enhancing 2.4cm right ovarian cyst with irregular margins, nonvisualized appendix, moderate pelvic free fluid collection, hepatomegaly. GYN consulted, recommend outpatient follow up.  Urine analysis x2 negative for infection.  No evidence of hydronephrosis, pyelonephritis, colitis on CT imaging.  Patient was evaluated by general surgery, no concerns, surgical sites are healing well.    Patient noted to have constipation, started on aggressive bowel regimen.  Patient had adequate bowel movements.  Continues with left lower quadrant abdominal pain, lesser in severity however still present.  Patient is able to tolerate p.o. intake with no further nausea or vomiting.  Patient is urinating well and adequate bowel movements.  Patient has history of celiac disease.  Asked patient to follow-up with gastroenterology and GYN outpatient.    Therefore, she is discharged in good and stable condition to home with close outpatient follow-up.    The patient met 2-midnight criteria for an inpatient stay at the time of discharge.    Discharge Date  05/31/2022    FOLLOW UP ITEMS POST  DISCHARGE  Primary care physician  Gastroenterology  Gynecology    DISCHARGE DIAGNOSES  Principal Problem:    Left lower quadrant abdominal pain POA: Yes  Active Problems:    Sepsis (HCC) POA: Unknown    Leukocytosis POA: Unknown    Ovarian cyst POA: Yes    Constipation POA: Yes    S/P appendectomy POA: Yes  Resolved Problems:    * No resolved hospital problems. *      FOLLOW UP  Scott Hamilton D.O.  [address] [address]  [phone]    Schedule an appointment as soon as possible for a visit in 2 week(s)      [facility]  [address] [place] [address] [phone]  Schedule an appointment as soon as possible for a visit in 1 week(s)      [facility] - [address] [address] [phone]  Schedule an appointment as soon as possible for a visit in 1 week(s)        MEDICATIONS ON DISCHARGE     Medication List      START taking these medications      Instructions   oxyCODONE immediate-release 5 MG Tabs  Commonly known as: ROXICODONE   Take 1 Tablet by mouth every 8 hours as needed for Severe Pain for up to 3 days.  Dose: 5 mg        CONTINUE taking these medications      Instructions   acetaminophen 500 MG Tabs  Commonly known as: TYLENOL   Take 500-1,000 mg by mouth every 6 hours as needed. Indications: Pain  Dose: 500-1,000 mg     CATS CLAW PO   Take 1 Tablet by mouth every day.  Dose: 1 Tablet     cetirizine 10 MG Tabs  Commonly known as: ZYRTEC   Take 10 mg by mouth every day.  Dose: 10 mg     oxyCODONE-acetaminophen 5-325 MG Tabs  Commonly known as: PERCOCET   Take 1 Tablet by mouth every four hours as needed for Severe Pain for up to 5 days.  Dose: 1 Tablet     polyethylene glycol/lytes 17 g Pack  Commonly known as: MIRALAX   Take 1 Packet by mouth every day.  Dose: 17 g     PRENATAL VITAMIN PO   Take 1 Tablet by mouth every day.  Dose: 1 Tablet            Allergies  Allergies   Allergen Reactions   • Vicodin  Hp [Apap-Fd&C Blue #1-Hydrocodone] Hives   • Gluten Meal        DIET  Orders Placed This Encounter   Procedures   • Diet Order Diet: Low Fiber(GI Soft)     Standing Status:   Standing     Number of Occurrences:   1     Order Specific Question:   Diet:     Answer:   Low Fiber(GI Soft) [2]       ACTIVITY  As tolerated.  Weight bearing as tolerated    CONSULTATIONS  General surgery  Gynecology    PROCEDURES  None    LABORATORY  Lab Results   Component Value Date    SODIUM 138 05/31/2022    POTASSIUM 4.0 05/31/2022    CHLORIDE 101 05/31/2022    CO2 25 05/31/2022    GLUCOSE 96 05/31/2022    BUN 10 05/31/2022    CREATININE 0.68 05/31/2022        Lab Results   Component Value Date    WBC 4.9 05/31/2022    HEMOGLOBIN 13.6 05/31/2022    HEMATOCRIT 38.9 05/31/2022    PLATELETCT 315 05/31/2022      CT-ABDOMEN-PELVIS WITH   Final Result         1.  Enhancing right ovarian cyst with irregular margins, appearance most compatible with involuting cyst.   2.  Nonvisualization of the appendix, cannot definitively evaluate for and/or exclude appendicitis   3.  Moderate size pelvic free fluid collection.   4.  Hepatomegaly      DX-ABDOMEN COMPLETE WITH AP OR PA CXR   Final Result         1.  No acute cardiopulmonary disease is evident.   2.  Moderate stool in the colon suggests changes of constipation, otherwise nonspecific bowel gas pattern.        Total time of the discharge process exceeds 45 minutes.

## 2022-05-31 NOTE — DISCHARGE PLANNING
TCN following. HTH/SCP chart review completed. Collaborated with YVROSE Freeman. No additional TCN needs identified at this time.     Previously completed:  - Choice forms: none  - [facility] referral (not sent- anticipating OP follow up).

## 2022-06-01 ENCOUNTER — TELEPHONE (OUTPATIENT)
Dept: OBGYN | Facility: CLINIC | Age: 22
End: 2022-06-01
Payer: COMMERCIAL

## 2022-06-01 ENCOUNTER — DOCUMENTATION (OUTPATIENT)
Dept: OBGYN | Facility: CLINIC | Age: 22
End: 2022-06-01
Payer: COMMERCIAL

## 2022-06-01 NOTE — TELEPHONE ENCOUNTER
Left voicemail to schedule with in the next week , NP GYN ER [facility]/ ok per Alysia, with [phone]

## 2022-06-02 LAB
BACTERIA BLD CULT: NORMAL
BACTERIA BLD CULT: NORMAL
BACTERIA UR CULT: NORMAL
SIGNIFICANT IND 70042: NORMAL
SITE SITE: NORMAL
SOURCE SOURCE: NORMAL

## 2022-06-03 NOTE — DOCUMENTATION QUERY
"                                                                         [facility]                                                                       Query Response Note      PATIENT:               OLEG SNEED  ACCT #:                  [id number]  MRN:                     [id number]  :                      2000  ADMIT DATE:       2022 1:35 AM  DISCH DATE:        2022 6:10 PM  RESPONDING  PROVIDER #:        [id number]           QUERY TEXT:    Pt has documented sepsis poa noted in Medical Record. Please clarify status of this condition:  NOTE:  If an appropriate response is not listed below, please respond with a new note.    Contact me for questions.    Thank you,  ABELARDO Amador, CDI  [email or web address]    The patient's Clinical Indicators include:  20 yo with dx of severe abdominal pain, right ovarian cyst, sepsis, appendectomy 22.     WBC 17.9;  WBC 4.9   LA 4.6;  LA 1.4   BC and Urine cultures negative to date     ED note VS /78  Pulse (!) 103  Temp 35.9 °C (96.6 °F) (Temporal)  Resp 16  Epic VS:  - 63; afebrile during the hospitalization     H&P \"Sepsis-Source is intra-abdominal infection\"   PN \"Positive for abdominal pain, nausea and vomiting.\" \"High anion gap metabolic acidosis: CO2 16, AG 17. Lactic acid 4.6->1.8 normalized.\"   PN \"Leukocytosis-Sec to infection vs. Reactive vs. Stress induced\"   PN \"Given no clear source of antibiotics, will discontinue antibiotics\"   PN \"Sepsis- Source is intra-abdominal infection vs. Dehydration vs. Stress\"    Risk factors: appendectomy 22, Enhancing right ovarian cyst with irregular margins, most compatible with involuting cyst, abdominal pain  Treatments: sepsis protocol initiated, labwork, imaging, specialty consult, medications  Options provided:   -- Sepsis has been ruled out   -- Sepsis present on admission   -- SIRS that is unrelated to any infection   -- " Unable to determine      Query created by: Smith, April on 5/31/2022 7:03 AM    RESPONSE TEXT:    Sepsis has been ruled out          Electronically signed by:  APRIL DAVIS MD 6/3/2022 7:16 AM

## 2022-06-08 ENCOUNTER — APPOINTMENT (OUTPATIENT)
Dept: OBGYN | Facility: CLINIC | Age: 22
End: 2022-06-08
Payer: COMMERCIAL

## 2022-06-08 ENCOUNTER — TELEPHONE (OUTPATIENT)
Dept: OBGYN | Facility: CLINIC | Age: 22
End: 2022-06-08

## 2022-06-08 NOTE — TELEPHONE ENCOUNTER
Phone call to pt to discuss follow-up care.Pt has been scheduled here tomorrow. No follow-up notes or documentation in chart as to why she was scheduled here as she was consulted with another provider from outside group.She was seen through ER on 5/28 c/o post-op appy with abdominal pain. Pt was referred to GI for follow-up and she was also seen by Dr.Staci gutierrez as ER GYN consult. She is doing well and feeling better. She is given name and # to [facility] to schedule. She is happy with this plan and support is given.[email or web address]

## 2022-06-09 ENCOUNTER — TELEPHONE (OUTPATIENT)
Dept: OBGYN | Facility: CLINIC | Age: 22
End: 2022-06-09

## (undated) DEVICE — DERMABOND ADVANCED - (12EA/BX)

## (undated) DEVICE — NEEDLE INSUFFLATION FOR STEP - (12/BX)

## (undated) DEVICE — SUTURE GENERAL

## (undated) DEVICE — PROTECTOR ULNA NERVE - (36PR/CA)

## (undated) DEVICE — TOWEL STOP TIMEOUT SAFETY FLAG (40EA/CA)

## (undated) DEVICE — CHLORAPREP 26 ML APPLICATOR - ORANGE TINT(25/CA)

## (undated) DEVICE — SET EXTENSION WITH 2 PORTS (48EA/CA) ***PART #2C8610 IS A SUBSTITUTE*****

## (undated) DEVICE — KIT ANESTHESIA W/CIRCUIT & 3/LT BAG W/FILTER (20EA/CA)

## (undated) DEVICE — MASK ANESTHESIA ADULT  - (100/CA)

## (undated) DEVICE — SET SUCTION/IRRIGATION WITH DISPOSABLE TIP (6/CA )PART #0250-070-520 IS A SUB

## (undated) DEVICE — GLOVE BIOGEL PI INDICATOR SZ 7.5 SURGICAL PF LF -(50/BX 4BX/CA)

## (undated) DEVICE — STAPLE 45MM VASCULAR WHITE 2.5MM (12EA/BX)

## (undated) DEVICE — NEPTUNE 4 PORT MANIFOLD - (20/PK)

## (undated) DEVICE — SET TUBING PNEUMOCLEAR HIGH FLOW SMOKE EVACUATION (10EA/BX)

## (undated) DEVICE — SODIUM CHL IRRIGATION 0.9% 1000ML (12EA/CA)

## (undated) DEVICE — SUTURE 4-0 MONOCRYL PLUS PS-2 - 27 INCH (36/BX)

## (undated) DEVICE — GLOVE SZ 7 BIOGEL PI MICRO - PF LF (50PR/BX 4BX/CA)

## (undated) DEVICE — CANISTER SUCTION 3000ML MECHANICAL FILTER AUTO SHUTOFF MEDI-VAC NONSTERILE LF DISP  (40EA/CA)

## (undated) DEVICE — CANNULA W/SEAL 5X100 Z-THRE - ADED KII (12/BX)

## (undated) DEVICE — BAG RETRIEVAL 10ML (10EA/BX)

## (undated) DEVICE — GOWN WARMING STANDARD FLEX - (30/CA)

## (undated) DEVICE — TROCAR 5X100 NON BLADED Z-TH - READ KII (6/BX)

## (undated) DEVICE — ELECTRODE 850 FOAM ADHESIVE - HYDROGEL RADIOTRNSPRNT (50/PK)

## (undated) DEVICE — GLOVE SZ 7.5 BIOGEL PI MICRO - PF LF (50PR/BX)

## (undated) DEVICE — SUTURE 0 VICRYL PLUS UR-6 - 27 INCH (36/BX)

## (undated) DEVICE — TROCAR Z THREAD12MM OPTICAL - NON BLADED (6/BX)

## (undated) DEVICE — LACTATED RINGERS INJ 1000 ML - (14EA/CA 60CA/PF)

## (undated) DEVICE — ELECTRODE DUAL RETURN W/ CORD - (50/PK)

## (undated) DEVICE — HEAD HOLDER JUNIOR/ADULT

## (undated) DEVICE — SLEEVE, VASO, THIGH, MED

## (undated) DEVICE — TROCAR 5X100 SEPARTATOR ADV - FIXATION (6/BX)

## (undated) DEVICE — SUCTION INSTRUMENT YANKAUER BULBOUS TIP W/O VENT (50EA/CA)

## (undated) DEVICE — SET LEADWIRE 5 LEAD BEDSIDE DISPOSABLE ECG (1SET OF 5/EA)

## (undated) DEVICE — TUBING CLEARLINK DUO-VENT - C-FLO (48EA/CA)

## (undated) DEVICE — TROCAR LAPSCP 100MM 12MM NTHRD - (6/BX)